# Patient Record
Sex: FEMALE | Race: BLACK OR AFRICAN AMERICAN
[De-identification: names, ages, dates, MRNs, and addresses within clinical notes are randomized per-mention and may not be internally consistent; named-entity substitution may affect disease eponyms.]

---

## 2017-08-11 ENCOUNTER — RECORD ABSTRACTING (OUTPATIENT)
Age: 62
End: 2017-08-11

## 2017-08-11 DIAGNOSIS — E04.0 NONTOXIC DIFFUSE GOITER: ICD-10-CM

## 2017-08-11 DIAGNOSIS — Z82.49 FAMILY HISTORY OF ISCHEMIC HEART DISEASE AND OTHER DISEASES OF THE CIRCULATORY SYSTEM: ICD-10-CM

## 2017-08-11 DIAGNOSIS — Z86.69 PERSONAL HISTORY OF OTHER DISEASES OF THE NERVOUS SYSTEM AND SENSE ORGANS: ICD-10-CM

## 2017-08-11 DIAGNOSIS — Z78.9 OTHER SPECIFIED HEALTH STATUS: ICD-10-CM

## 2017-08-21 ENCOUNTER — APPOINTMENT (OUTPATIENT)
Dept: CARDIOLOGY | Facility: CLINIC | Age: 62
End: 2017-08-21
Payer: MEDICARE

## 2017-08-21 VITALS — OXYGEN SATURATION: 98 % | WEIGHT: 191 LBS | HEIGHT: 67 IN | HEART RATE: 83 BPM | BODY MASS INDEX: 29.98 KG/M2

## 2017-08-21 VITALS — SYSTOLIC BLOOD PRESSURE: 110 MMHG | DIASTOLIC BLOOD PRESSURE: 80 MMHG

## 2017-08-21 PROCEDURE — 99214 OFFICE O/P EST MOD 30 MIN: CPT | Mod: 25

## 2017-08-21 PROCEDURE — 93000 ELECTROCARDIOGRAM COMPLETE: CPT

## 2017-08-28 ENCOUNTER — OTHER (OUTPATIENT)
Age: 62
End: 2017-08-28

## 2017-11-13 ENCOUNTER — APPOINTMENT (OUTPATIENT)
Dept: CARDIOLOGY | Facility: CLINIC | Age: 62
End: 2017-11-13
Payer: MEDICARE

## 2017-11-13 VITALS — SYSTOLIC BLOOD PRESSURE: 130 MMHG | DIASTOLIC BLOOD PRESSURE: 80 MMHG

## 2017-11-13 PROCEDURE — 93000 ELECTROCARDIOGRAM COMPLETE: CPT

## 2017-11-13 PROCEDURE — 99214 OFFICE O/P EST MOD 30 MIN: CPT

## 2018-01-16 ENCOUNTER — APPOINTMENT (OUTPATIENT)
Dept: OTOLARYNGOLOGY | Facility: CLINIC | Age: 63
End: 2018-01-16
Payer: MEDICARE

## 2018-01-16 VITALS
RESPIRATION RATE: 18 BRPM | WEIGHT: 194 LBS | BODY MASS INDEX: 30.09 KG/M2 | SYSTOLIC BLOOD PRESSURE: 130 MMHG | DIASTOLIC BLOOD PRESSURE: 80 MMHG | HEIGHT: 67.5 IN | HEART RATE: 83 BPM

## 2018-01-16 DIAGNOSIS — M54.2 CERVICALGIA: ICD-10-CM

## 2018-01-16 DIAGNOSIS — K11.8 OTHER DISEASES OF SALIVARY GLANDS: ICD-10-CM

## 2018-01-16 DIAGNOSIS — M47.812 SPONDYLOSIS W/OUT MYELOPATHY OR RADICULOPATHY, CERVICAL REGION: ICD-10-CM

## 2018-01-16 PROCEDURE — 99204 OFFICE O/P NEW MOD 45 MIN: CPT

## 2018-02-05 ENCOUNTER — APPOINTMENT (OUTPATIENT)
Dept: CARDIOLOGY | Facility: CLINIC | Age: 63
End: 2018-02-05
Payer: MEDICARE

## 2018-02-05 ENCOUNTER — NON-APPOINTMENT (OUTPATIENT)
Age: 63
End: 2018-02-05

## 2018-02-05 VITALS
RESPIRATION RATE: 17 BRPM | BODY MASS INDEX: 29.01 KG/M2 | OXYGEN SATURATION: 94 % | WEIGHT: 188 LBS | HEART RATE: 58 BPM

## 2018-02-05 VITALS — DIASTOLIC BLOOD PRESSURE: 80 MMHG | SYSTOLIC BLOOD PRESSURE: 112 MMHG

## 2018-02-05 PROCEDURE — 93000 ELECTROCARDIOGRAM COMPLETE: CPT

## 2018-02-05 PROCEDURE — 99213 OFFICE O/P EST LOW 20 MIN: CPT

## 2018-06-08 ENCOUNTER — APPOINTMENT (OUTPATIENT)
Dept: CARDIOLOGY | Facility: CLINIC | Age: 63
End: 2018-06-08
Payer: MEDICARE

## 2018-06-08 ENCOUNTER — NON-APPOINTMENT (OUTPATIENT)
Age: 63
End: 2018-06-08

## 2018-06-08 VITALS — DIASTOLIC BLOOD PRESSURE: 80 MMHG | SYSTOLIC BLOOD PRESSURE: 120 MMHG

## 2018-06-08 VITALS — HEIGHT: 67.5 IN | WEIGHT: 186 LBS | BODY MASS INDEX: 28.85 KG/M2 | OXYGEN SATURATION: 98 % | HEART RATE: 90 BPM

## 2018-06-08 PROCEDURE — 99213 OFFICE O/P EST LOW 20 MIN: CPT

## 2018-08-08 ENCOUNTER — APPOINTMENT (OUTPATIENT)
Dept: NEUROLOGY | Facility: CLINIC | Age: 63
End: 2018-08-08

## 2018-12-12 ENCOUNTER — APPOINTMENT (OUTPATIENT)
Dept: CARDIOLOGY | Facility: CLINIC | Age: 63
End: 2018-12-12
Payer: MEDICARE

## 2018-12-12 ENCOUNTER — NON-APPOINTMENT (OUTPATIENT)
Age: 63
End: 2018-12-12

## 2018-12-12 VITALS
WEIGHT: 183 LBS | HEIGHT: 67.5 IN | OXYGEN SATURATION: 96 % | HEART RATE: 94 BPM | BODY MASS INDEX: 28.39 KG/M2 | RESPIRATION RATE: 16 BRPM

## 2018-12-12 VITALS — DIASTOLIC BLOOD PRESSURE: 70 MMHG | SYSTOLIC BLOOD PRESSURE: 116 MMHG

## 2018-12-12 PROCEDURE — 99214 OFFICE O/P EST MOD 30 MIN: CPT

## 2018-12-12 PROCEDURE — 93000 ELECTROCARDIOGRAM COMPLETE: CPT

## 2018-12-12 RX ORDER — HYDROCORTISONE 1 %
12 CREAM (GRAM) TOPICAL
Qty: 225 | Refills: 0 | Status: DISCONTINUED | COMMUNITY
Start: 2018-11-29

## 2018-12-12 NOTE — DISCUSSION/SUMMARY
[FreeTextEntry1] : As she is doing well, did not make any changes in the medication.  I encouraged her to diet and increase exercise to help her lose weight and to improve cardiovascular fitness.  I will try and get copies of blood tests from Dr. Steve.

## 2018-12-12 NOTE — ASSESSMENT
[FreeTextEntry1] : She is stable from cardiac point of view.  Blood pressure is well controlled without medications.  And last A1c seems was 5.7 consistent with a prediabetes.

## 2018-12-12 NOTE — PHYSICAL EXAM
[General Appearance - Well Developed] : well developed [Normal Appearance] : normal appearance [Well Groomed] : well groomed [General Appearance - Well Nourished] : well nourished [No Deformities] : no deformities [General Appearance - In No Acute Distress] : no acute distress [Normal Conjunctiva] : the conjunctiva exhibited no abnormalities [Eyelids - No Xanthelasma] : the eyelids demonstrated no xanthelasmas [Normal Oral Mucosa] : normal oral mucosa [No Oral Pallor] : no oral pallor [No Oral Cyanosis] : no oral cyanosis [Normal Jugular Venous A Waves Present] : normal jugular venous A waves present [Normal Jugular Venous V Waves Present] : normal jugular venous V waves present [No Jugular Venous Henry A Waves] : no jugular venous henry A waves [Respiration, Rhythm And Depth] : normal respiratory rhythm and effort [Exaggerated Use Of Accessory Muscles For Inspiration] : no accessory muscle use [Auscultation Breath Sounds / Voice Sounds] : lungs were clear to auscultation bilaterally [Heart Rate And Rhythm] : heart rate and rhythm were normal [Heart Sounds] : normal S1 and S2 [Murmurs] : no murmurs present [Abdomen Soft] : soft [Abdomen Tenderness] : non-tender [Abdomen Mass (___ Cm)] : no abdominal mass palpated [Abnormal Walk] : normal gait [Gait - Sufficient For Exercise Testing] : the gait was sufficient for exercise testing [Nail Clubbing] : no clubbing of the fingernails [Cyanosis, Localized] : no localized cyanosis [Petechial Hemorrhages (___cm)] : no petechial hemorrhages [Skin Color & Pigmentation] : normal skin color and pigmentation [] : no rash [No Venous Stasis] : no venous stasis [Skin Lesions] : no skin lesions [No Skin Ulcers] : no skin ulcer [No Xanthoma] : no  xanthoma was observed [Oriented To Time, Place, And Person] : oriented to person, place, and time [Affect] : the affect was normal [Mood] : the mood was normal [No Anxiety] : not feeling anxious

## 2018-12-12 NOTE — HISTORY OF PRESENT ILLNESS
[FreeTextEntry1] : She denies any cardiac complaints.  She has gained 8 pounds in the last 2 months.

## 2019-05-13 ENCOUNTER — APPOINTMENT (OUTPATIENT)
Dept: CARDIOLOGY | Facility: CLINIC | Age: 64
End: 2019-05-13
Payer: MEDICARE

## 2019-05-13 ENCOUNTER — NON-APPOINTMENT (OUTPATIENT)
Age: 64
End: 2019-05-13

## 2019-05-13 VITALS — HEART RATE: 90 BPM | OXYGEN SATURATION: 98 % | HEIGHT: 67 IN | WEIGHT: 190 LBS | BODY MASS INDEX: 29.82 KG/M2

## 2019-05-13 PROCEDURE — 99214 OFFICE O/P EST MOD 30 MIN: CPT

## 2019-05-13 PROCEDURE — 93000 ELECTROCARDIOGRAM COMPLETE: CPT

## 2019-05-13 NOTE — HISTORY OF PRESENT ILLNESS
[FreeTextEntry1] : She denies any cardiac complaints.  She has gained about 8 pounds since her last visit.

## 2019-05-13 NOTE — DISCUSSION/SUMMARY
[FreeTextEntry1] : She is not on any cardiac medication at this time.  I did not make any changes.  Fasting blood tests were ordered.  I recommended that she continue her follow-up with the endocrinologist.

## 2019-05-13 NOTE — ASSESSMENT
[FreeTextEntry1] : She is doing well from cardiac point of view.  Blood pressure is well controlled.  The chest pain or palpitations etc.  Fasting blood tests are long overdue.

## 2019-05-13 NOTE — PHYSICAL EXAM
[General Appearance - Well Developed] : well developed [Normal Appearance] : normal appearance [Well Groomed] : well groomed [General Appearance - Well Nourished] : well nourished [No Deformities] : no deformities [General Appearance - In No Acute Distress] : no acute distress [Eyelids - No Xanthelasma] : the eyelids demonstrated no xanthelasmas [Normal Conjunctiva] : the conjunctiva exhibited no abnormalities [No Oral Pallor] : no oral pallor [Normal Oral Mucosa] : normal oral mucosa [No Oral Cyanosis] : no oral cyanosis [Normal Jugular Venous A Waves Present] : normal jugular venous A waves present [Normal Jugular Venous V Waves Present] : normal jugular venous V waves present [No Jugular Venous Henry A Waves] : no jugular venous henry A waves [Respiration, Rhythm And Depth] : normal respiratory rhythm and effort [Exaggerated Use Of Accessory Muscles For Inspiration] : no accessory muscle use [Heart Rate And Rhythm] : heart rate and rhythm were normal [Auscultation Breath Sounds / Voice Sounds] : lungs were clear to auscultation bilaterally [Heart Sounds] : normal S1 and S2 [Systolic grade ___/6] : A grade [unfilled]/6 systolic murmur was heard. [Abdomen Soft] : soft [Abdomen Tenderness] : non-tender [Abnormal Walk] : normal gait [Abdomen Mass (___ Cm)] : no abdominal mass palpated [Gait - Sufficient For Exercise Testing] : the gait was sufficient for exercise testing [Nail Clubbing] : no clubbing of the fingernails [Cyanosis, Localized] : no localized cyanosis [Petechial Hemorrhages (___cm)] : no petechial hemorrhages [Skin Color & Pigmentation] : normal skin color and pigmentation [] : no rash [No Venous Stasis] : no venous stasis [Skin Lesions] : no skin lesions [No Skin Ulcers] : no skin ulcer [No Xanthoma] : no  xanthoma was observed [Oriented To Time, Place, And Person] : oriented to person, place, and time [Affect] : the affect was normal [Mood] : the mood was normal [No Anxiety] : not feeling anxious

## 2019-11-11 ENCOUNTER — APPOINTMENT (OUTPATIENT)
Dept: CARDIOLOGY | Facility: CLINIC | Age: 64
End: 2019-11-11

## 2021-09-13 ENCOUNTER — APPOINTMENT (OUTPATIENT)
Dept: INTERNAL MEDICINE | Facility: CLINIC | Age: 66
End: 2021-09-13
Payer: MEDICARE

## 2021-09-13 VITALS
HEIGHT: 65 IN | WEIGHT: 189 LBS | TEMPERATURE: 97.6 F | BODY MASS INDEX: 31.49 KG/M2 | OXYGEN SATURATION: 98 % | SYSTOLIC BLOOD PRESSURE: 126 MMHG | HEART RATE: 99 BPM | DIASTOLIC BLOOD PRESSURE: 84 MMHG | RESPIRATION RATE: 17 BRPM

## 2021-09-13 VITALS — HEIGHT: 67.25 IN | BODY MASS INDEX: 29.38 KG/M2

## 2021-09-13 DIAGNOSIS — R09.89 OTHER SPECIFIED SYMPTOMS AND SIGNS INVOLVING THE CIRCULATORY AND RESPIRATORY SYSTEMS: ICD-10-CM

## 2021-09-13 DIAGNOSIS — Z86.79 PERSONAL HISTORY OF OTHER DISEASES OF THE CIRCULATORY SYSTEM: ICD-10-CM

## 2021-09-13 DIAGNOSIS — E78.5 HYPERLIPIDEMIA, UNSPECIFIED: ICD-10-CM

## 2021-09-13 DIAGNOSIS — Z23 ENCOUNTER FOR IMMUNIZATION: ICD-10-CM

## 2021-09-13 PROCEDURE — 99214 OFFICE O/P EST MOD 30 MIN: CPT

## 2021-09-18 PROBLEM — Z86.79 HISTORY OF HYPERTENSION: Status: RESOLVED | Noted: 2017-08-11 | Resolved: 2021-09-18

## 2021-09-18 PROBLEM — E78.5 DYSLIPIDEMIA: Status: RESOLVED | Noted: 2017-08-21 | Resolved: 2021-09-18

## 2021-09-18 NOTE — PHYSICAL EXAM
[No Acute Distress] : no acute distress [Well Nourished] : well nourished [Well Developed] : well developed [Well-Appearing] : well-appearing [Normal Sclera/Conjunctiva] : normal sclera/conjunctiva [PERRL] : pupils equal round and reactive to light [EOMI] : extraocular movements intact [Normal Outer Ear/Nose] : the outer ears and nose were normal in appearance [Normal Oropharynx] : the oropharynx was normal [No JVD] : no jugular venous distention [No Lymphadenopathy] : no lymphadenopathy [Supple] : supple [Thyroid Normal, No Nodules] : the thyroid was normal and there were no nodules present [No Respiratory Distress] : no respiratory distress  [No Accessory Muscle Use] : no accessory muscle use [Clear to Auscultation] : lungs were clear to auscultation bilaterally [Normal Rate] : normal rate  [Regular Rhythm] : with a regular rhythm [Normal S1, S2] : normal S1 and S2 [No Murmur] : no murmur heard [No Carotid Bruits] : no carotid bruits [No Abdominal Bruit] : a ~M bruit was not heard ~T in the abdomen [No Edema] : there was no peripheral edema [No Palpable Aorta] : no palpable aorta [No Extremity Clubbing/Cyanosis] : no extremity clubbing/cyanosis [Soft] : abdomen soft [Non Tender] : non-tender [Non-distended] : non-distended [No Masses] : no abdominal mass palpated [No HSM] : no HSM [Normal Bowel Sounds] : normal bowel sounds [Normal Posterior Cervical Nodes] : no posterior cervical lymphadenopathy [Normal Anterior Cervical Nodes] : no anterior cervical lymphadenopathy [No CVA Tenderness] : no CVA  tenderness [No Spinal Tenderness] : no spinal tenderness [No Joint Swelling] : no joint swelling [Grossly Normal Strength/Tone] : grossly normal strength/tone [No Rash] : no rash [Coordination Grossly Intact] : coordination grossly intact [No Focal Deficits] : no focal deficits [Normal Gait] : normal gait [Deep Tendon Reflexes (DTR)] : deep tendon reflexes were 2+ and symmetric [Normal Affect] : the affect was normal [Normal Insight/Judgement] : insight and judgment were intact [de-identified] : scatterred broken blood vessels, spider veins, areas of varicosities noted. palp dp, weak pt.

## 2021-09-18 NOTE — ASSESSMENT
[FreeTextEntry1] : Cold legs/ diminished PTibialis- chk ble art doppler. symptoms are likely radicular. \par Prediabetes- d/wl, chk labs\par Eagles syndrome- stable\par Glaucoma- stable, cont drops per optho\par Total time 30 min- 20 min. FTF and 10 min chart review and documentation.\par

## 2021-11-01 ENCOUNTER — APPOINTMENT (OUTPATIENT)
Dept: UROLOGY | Facility: CLINIC | Age: 66
End: 2021-11-01
Payer: MEDICARE

## 2021-11-01 ENCOUNTER — NON-APPOINTMENT (OUTPATIENT)
Age: 66
End: 2021-11-01

## 2021-11-01 VITALS
BODY MASS INDEX: 29.27 KG/M2 | SYSTOLIC BLOOD PRESSURE: 104 MMHG | HEART RATE: 98 BPM | DIASTOLIC BLOOD PRESSURE: 71 MMHG | HEIGHT: 67 IN | TEMPERATURE: 97.5 F | WEIGHT: 186.5 LBS

## 2021-11-01 PROCEDURE — 99204 OFFICE O/P NEW MOD 45 MIN: CPT

## 2021-11-01 NOTE — LETTER BODY
[Dear  ___] : Dear  [unfilled], [Consult Letter:] : I had the pleasure of evaluating your patient, [unfilled]. [Please see my note below.] : Please see my note below. [Consult Closing:] : Thank you very much for allowing me to participate in the care of this patient.  If you have any questions, please do not hesitate to contact me. [Sincerely,] : Sincerely, [FreeTextEntry3] : Boyd Buitrago MD\par

## 2021-11-01 NOTE — REVIEW OF SYSTEMS
[Wake up at night to urinate  How many times?  ___] : wakes up to urinate [unfilled] times during the night [Leakage of urine with straining, coughing, laughing] : leakage of urine with straining, coughing, laughing

## 2021-11-01 NOTE — HISTORY OF PRESENT ILLNESS
[FreeTextEntry1] : new onset of stress incontinence since June 2021. Used to have urgency incontinence earlier, now has stress incontinence as well. Panties are wet in the night, soiled not heavy wet. \par She had some tests done by GYN, bladder scan and ?urodynamics. Had surgery for uterine prolapse and ? oophorectomy in September 2021. \par \par O/E no cystocele, no rectocele. No erosion. Stress incontinence could not be elicited as bladder was empty. \par \par will get records from GYN and hospital. RTC one month\par

## 2021-11-01 NOTE — PHYSICAL EXAM
[General Appearance - Well Developed] : well developed [General Appearance - Well Nourished] : well nourished [Normal Appearance] : normal appearance [Well Groomed] : well groomed [General Appearance - In No Acute Distress] : no acute distress [Edema] : no peripheral edema [Respiration, Rhythm And Depth] : normal respiratory rhythm and effort [Exaggerated Use Of Accessory Muscles For Inspiration] : no accessory muscle use [Abdomen Soft] : soft [Abdomen Tenderness] : non-tender [Costovertebral Angle Tenderness] : no ~M costovertebral angle tenderness [Urethral Meatus] : normal urethra [Urinary Bladder Findings] : the bladder was normal on palpation [External Female Genitalia] : normal external genitalia [Vagina] : normal vaginal exam [Normal Station and Gait] : the gait and station were normal for the patient's age [] : no rash [No Focal Deficits] : no focal deficits [Oriented To Time, Place, And Person] : oriented to person, place, and time [Affect] : the affect was normal [Mood] : the mood was normal [Not Anxious] : not anxious [No Palpable Adenopathy] : no palpable adenopathy [FreeTextEntry1] : s/p Hysterectomy

## 2021-12-01 ENCOUNTER — NON-APPOINTMENT (OUTPATIENT)
Age: 66
End: 2021-12-01

## 2021-12-06 ENCOUNTER — APPOINTMENT (OUTPATIENT)
Dept: UROLOGY | Facility: CLINIC | Age: 66
End: 2021-12-06

## 2021-12-14 ENCOUNTER — APPOINTMENT (OUTPATIENT)
Dept: INTERNAL MEDICINE | Facility: CLINIC | Age: 66
End: 2021-12-14

## 2021-12-22 ENCOUNTER — APPOINTMENT (OUTPATIENT)
Dept: UROLOGY | Facility: CLINIC | Age: 66
End: 2021-12-22
Payer: MEDICARE

## 2021-12-22 VITALS — SYSTOLIC BLOOD PRESSURE: 111 MMHG | TEMPERATURE: 97.1 F | DIASTOLIC BLOOD PRESSURE: 77 MMHG | HEART RATE: 132 BPM

## 2021-12-22 PROCEDURE — 99213 OFFICE O/P EST LOW 20 MIN: CPT

## 2021-12-22 NOTE — HISTORY OF PRESENT ILLNESS
[FreeTextEntry1] : new onset of stress incontinence since June 2021. Used to have urgency incontinence earlier, now has stress incontinence as well. Panties are wet in the night, soiled not heavy wet. \par She had some tests done by GYN, bladder scan and ?urodynamics. Had surgery for uterine prolapse and ? oophorectomy in September 2021. \par \par O/E no cystocele, no rectocele. No erosion. Stress incontinence could not be elicited as bladder was empty. \par \par will get records from GYN and hospital. RTC one month\par \par 12/22/2021: Ms. AGUILA is a 66 year female who presents today for a follow for mixed incontinence. She said she had urgency, but now if she coughs and sneezes she wets herself after her surgery. Surgery was Sacrocolpopexy anterior an posterior in Sept 2020 at [facility] for large cystocele and rectocele.  Since then developed stress incontinence. She is advised for Kegel exercise and to have a controlled diet. She is advised to have timed voiding to avoid leaking,\par \par Will get UA and culture \par \par Follow up in 3 months to check UA and culture.\par \par

## 2021-12-22 NOTE — ADDENDUM
[FreeTextEntry1] : I, Geovanny Gonzalez, acted solely as a scribe for Dr. Boyd Buitrago on this date, 12/22/2021. \par \par All medical record entries made by Scribe were at my Dr. Boyd Buitrago direction and personally dictated by be on, 12/22/2021. I have reviewed the chart and agree that the record accurately reflects my personal performance of the history, physical exam, assessment and plan, I have also personally directed, reviewed and agreed with the chart.\par

## 2021-12-22 NOTE — PHYSICAL EXAM

## 2021-12-23 LAB
APPEARANCE: CLEAR
BACTERIA: NEGATIVE
BILIRUBIN URINE: NEGATIVE
BLOOD URINE: NEGATIVE
COLOR: YELLOW
GLUCOSE QUALITATIVE U: NEGATIVE
HYALINE CASTS: 0 /LPF
KETONES URINE: NEGATIVE
LEUKOCYTE ESTERASE URINE: NEGATIVE
MICROSCOPIC-UA: NORMAL
NITRITE URINE: NEGATIVE
PH URINE: 7
PROTEIN URINE: NEGATIVE
RED BLOOD CELLS URINE: 1 /HPF
SPECIFIC GRAVITY URINE: 1.01
SQUAMOUS EPITHELIAL CELLS: 2 /HPF
UROBILINOGEN URINE: NORMAL
WHITE BLOOD CELLS URINE: 0 /HPF

## 2021-12-27 LAB — BACTERIA UR CULT: ABNORMAL

## 2021-12-28 ENCOUNTER — APPOINTMENT (OUTPATIENT)
Dept: INTERNAL MEDICINE | Facility: CLINIC | Age: 66
End: 2021-12-28
Payer: MEDICARE

## 2021-12-28 VITALS
TEMPERATURE: 96.6 F | HEIGHT: 67 IN | HEART RATE: 87 BPM | OXYGEN SATURATION: 98 % | WEIGHT: 192 LBS | SYSTOLIC BLOOD PRESSURE: 124 MMHG | RESPIRATION RATE: 18 BRPM | DIASTOLIC BLOOD PRESSURE: 82 MMHG | BODY MASS INDEX: 30.13 KG/M2

## 2021-12-28 PROCEDURE — 99214 OFFICE O/P EST MOD 30 MIN: CPT

## 2021-12-29 LAB
25(OH)D3 SERPL-MCNC: 21.4 NG/ML
ALBUMIN SERPL ELPH-MCNC: 4.4 G/DL
ALP BLD-CCNC: 84 U/L
ALT SERPL-CCNC: 28 U/L
ANION GAP SERPL CALC-SCNC: 12 MMOL/L
APPEARANCE: CLEAR
AST SERPL-CCNC: 22 U/L
BACTERIA: NEGATIVE
BASOPHILS # BLD AUTO: 0.04 K/UL
BASOPHILS NFR BLD AUTO: 1 %
BILIRUB SERPL-MCNC: 0.4 MG/DL
BILIRUBIN URINE: NEGATIVE
BLOOD URINE: NEGATIVE
BUN SERPL-MCNC: 9 MG/DL
CALCIUM SERPL-MCNC: 9.5 MG/DL
CHLORIDE SERPL-SCNC: 103 MMOL/L
CHOLEST SERPL-MCNC: 201 MG/DL
CO2 SERPL-SCNC: 27 MMOL/L
COLOR: NORMAL
CREAT SERPL-MCNC: 0.74 MG/DL
EOSINOPHIL # BLD AUTO: 0.03 K/UL
EOSINOPHIL NFR BLD AUTO: 0.7 %
ESTIMATED AVERAGE GLUCOSE: 128 MG/DL
FOLATE SERPL-MCNC: 12.4 NG/ML
GLUCOSE QUALITATIVE U: NEGATIVE
GLUCOSE SERPL-MCNC: 93 MG/DL
HBA1C MFR BLD HPLC: 6.1 %
HCT VFR BLD CALC: 42.5 %
HDLC SERPL-MCNC: 56 MG/DL
HGB BLD-MCNC: 14 G/DL
HYALINE CASTS: 1 /LPF
IMM GRANULOCYTES NFR BLD AUTO: 0 %
KETONES URINE: NEGATIVE
LDLC SERPL CALC-MCNC: 117 MG/DL
LEUKOCYTE ESTERASE URINE: NEGATIVE
LYMPHOCYTES # BLD AUTO: 1.91 K/UL
LYMPHOCYTES NFR BLD AUTO: 46.9 %
MAN DIFF?: NORMAL
MCHC RBC-ENTMCNC: 30.9 PG
MCHC RBC-ENTMCNC: 32.9 GM/DL
MCV RBC AUTO: 93.8 FL
MICROSCOPIC-UA: NORMAL
MONOCYTES # BLD AUTO: 0.35 K/UL
MONOCYTES NFR BLD AUTO: 8.6 %
NEUTROPHILS # BLD AUTO: 1.74 K/UL
NEUTROPHILS NFR BLD AUTO: 42.8 %
NITRITE URINE: NEGATIVE
NONHDLC SERPL-MCNC: 145 MG/DL
PH URINE: 7
PLATELET # BLD AUTO: 259 K/UL
POTASSIUM SERPL-SCNC: 4.5 MMOL/L
PROT SERPL-MCNC: 7.2 G/DL
PROTEIN URINE: NEGATIVE
RBC # BLD: 4.53 M/UL
RBC # FLD: 14 %
RED BLOOD CELLS URINE: 1 /HPF
SODIUM SERPL-SCNC: 142 MMOL/L
SPECIFIC GRAVITY URINE: 1.01
SQUAMOUS EPITHELIAL CELLS: 7 /HPF
T3 SERPL-MCNC: 126 NG/DL
T4 FREE SERPL-MCNC: 1.2 NG/DL
TRIGL SERPL-MCNC: 139 MG/DL
TSH SERPL-ACNC: 0.63 UIU/ML
UROBILINOGEN URINE: NORMAL
VIT B12 SERPL-MCNC: 489 PG/ML
WBC # FLD AUTO: 4.07 K/UL
WHITE BLOOD CELLS URINE: 2 /HPF

## 2021-12-29 NOTE — HISTORY OF PRESENT ILLNESS
[FreeTextEntry1] : Follow up  [de-identified] : pfizer booster nov.\par decl flu vac. \par Otherwise feels good. Appet, sleep, bms, voiding, mood all ok. no pain.\par

## 2021-12-29 NOTE — PHYSICAL EXAM
[No Acute Distress] : no acute distress [Well Nourished] : well nourished [Well Developed] : well developed [Well-Appearing] : well-appearing [Normal Sclera/Conjunctiva] : normal sclera/conjunctiva [PERRL] : pupils equal round and reactive to light [EOMI] : extraocular movements intact [Normal Outer Ear/Nose] : the outer ears and nose were normal in appearance [Normal Oropharynx] : the oropharynx was normal [No JVD] : no jugular venous distention [No Lymphadenopathy] : no lymphadenopathy [Supple] : supple [Thyroid Normal, No Nodules] : the thyroid was normal and there were no nodules present [No Respiratory Distress] : no respiratory distress  [No Accessory Muscle Use] : no accessory muscle use [Clear to Auscultation] : lungs were clear to auscultation bilaterally [Normal Rate] : normal rate  [Regular Rhythm] : with a regular rhythm [Normal S1, S2] : normal S1 and S2 [No Murmur] : no murmur heard [No Carotid Bruits] : no carotid bruits [No Abdominal Bruit] : a ~M bruit was not heard ~T in the abdomen [No Edema] : there was no peripheral edema [No Palpable Aorta] : no palpable aorta [No Extremity Clubbing/Cyanosis] : no extremity clubbing/cyanosis [Soft] : abdomen soft [Non Tender] : non-tender [Non-distended] : non-distended [No Masses] : no abdominal mass palpated [No HSM] : no HSM [Normal Bowel Sounds] : normal bowel sounds [Normal Posterior Cervical Nodes] : no posterior cervical lymphadenopathy [Normal Anterior Cervical Nodes] : no anterior cervical lymphadenopathy [No CVA Tenderness] : no CVA  tenderness [No Spinal Tenderness] : no spinal tenderness [No Joint Swelling] : no joint swelling [Grossly Normal Strength/Tone] : grossly normal strength/tone [No Rash] : no rash [Coordination Grossly Intact] : coordination grossly intact [No Focal Deficits] : no focal deficits [Normal Gait] : normal gait [Deep Tendon Reflexes (DTR)] : deep tendon reflexes were 2+ and symmetric [Normal Affect] : the affect was normal [Normal Insight/Judgement] : insight and judgment were intact [de-identified] : scatterred broken blood vessels, spider veins, areas of varicosities noted. palp dp, weak pt.

## 2021-12-29 NOTE — ASSESSMENT
Called pt. For TCM. LM with family to have pt. Call the office. Pt. Was sleeping at the time of the call.  Admitted for Influenza/ pneumonia. Admitted 2/14, discharged 2/20. Started on Augmentin, Prednisone, and lisinopril.    [FreeTextEntry1] : Prediabetes- d/wl, chk labs\par Eagles syndrome- stable\par Glaucoma- stable, cont drops per optho\par Total time 30 min- 20 min. FTF and 10 min chart review and documentation.\par

## 2021-12-30 ENCOUNTER — NON-APPOINTMENT (OUTPATIENT)
Age: 66
End: 2021-12-30

## 2021-12-30 LAB — BACTERIA UR CULT: NORMAL

## 2022-03-03 ENCOUNTER — APPOINTMENT (OUTPATIENT)
Dept: INTERNAL MEDICINE | Facility: CLINIC | Age: 67
End: 2022-03-03
Payer: MEDICARE

## 2022-03-03 VITALS
DIASTOLIC BLOOD PRESSURE: 78 MMHG | OXYGEN SATURATION: 98 % | BODY MASS INDEX: 29.51 KG/M2 | SYSTOLIC BLOOD PRESSURE: 126 MMHG | RESPIRATION RATE: 18 BRPM | TEMPERATURE: 96 F | WEIGHT: 188 LBS | HEART RATE: 78 BPM | HEIGHT: 67 IN

## 2022-03-03 DIAGNOSIS — R06.00 DYSPNEA, UNSPECIFIED: ICD-10-CM

## 2022-03-03 DIAGNOSIS — R05.9 COUGH, UNSPECIFIED: ICD-10-CM

## 2022-03-03 DIAGNOSIS — Z87.09 PERSONAL HISTORY OF OTHER DISEASES OF THE RESPIRATORY SYSTEM: ICD-10-CM

## 2022-03-03 PROCEDURE — 99214 OFFICE O/P EST MOD 30 MIN: CPT

## 2022-03-03 NOTE — ASSESSMENT
[FreeTextEntry1] : Prediabetes- d/wl, chk labs\par Eagles syndrome- stable\par Glaucoma- stable, cont drops per optho\par Cough- just with swallowing liquids- start with ENT eval, FEEST? \par Peripheral Neuropathy- r/o lumbar radic, b12 was ok. chk labs-lyme, esr, EVANGELINA. declines gabapentin. agrees to ncs ble. \par Chronic Venous insuff- low Na diet, elevate, compression stockings\par GOLDEN- primarily when climbing steps. seeing cardio. \par Pt. was encouraged to do all relevant screening tests including but not limited to mammogram, gyn visit, colonoscopy, bone density, annual skin exam.\par Total time 30 min- 20 min. FTF and 10 min chart review and documentation.\par \par \par

## 2022-03-03 NOTE — PHYSICAL EXAM
[No Acute Distress] : no acute distress [Well Nourished] : well nourished [Well Developed] : well developed [Well-Appearing] : well-appearing [Normal Sclera/Conjunctiva] : normal sclera/conjunctiva [PERRL] : pupils equal round and reactive to light [EOMI] : extraocular movements intact [Normal Outer Ear/Nose] : the outer ears and nose were normal in appearance [Normal Oropharynx] : the oropharynx was normal [No JVD] : no jugular venous distention [No Lymphadenopathy] : no lymphadenopathy [Supple] : supple [Thyroid Normal, No Nodules] : the thyroid was normal and there were no nodules present [No Respiratory Distress] : no respiratory distress  [No Accessory Muscle Use] : no accessory muscle use [Clear to Auscultation] : lungs were clear to auscultation bilaterally [Normal Rate] : normal rate  [Regular Rhythm] : with a regular rhythm [Normal S1, S2] : normal S1 and S2 [No Murmur] : no murmur heard [No Carotid Bruits] : no carotid bruits [No Abdominal Bruit] : a ~M bruit was not heard ~T in the abdomen [Pedal Pulses Present] : the pedal pulses are present [No Palpable Aorta] : no palpable aorta [No Extremity Clubbing/Cyanosis] : no extremity clubbing/cyanosis [Soft] : abdomen soft [Non Tender] : non-tender [Non-distended] : non-distended [No Masses] : no abdominal mass palpated [No HSM] : no HSM [Normal Bowel Sounds] : normal bowel sounds [Normal Posterior Cervical Nodes] : no posterior cervical lymphadenopathy [Normal Anterior Cervical Nodes] : no anterior cervical lymphadenopathy [No CVA Tenderness] : no CVA  tenderness [No Spinal Tenderness] : no spinal tenderness [No Joint Swelling] : no joint swelling [Grossly Normal Strength/Tone] : grossly normal strength/tone [No Rash] : no rash [Coordination Grossly Intact] : coordination grossly intact [No Focal Deficits] : no focal deficits [Normal Gait] : normal gait [Deep Tendon Reflexes (DTR)] : deep tendon reflexes were 2+ and symmetric [Normal Affect] : the affect was normal [Normal Insight/Judgement] : insight and judgment were intact [de-identified] : trace bipedal edema, spider veins, varicosites ankle/foot

## 2022-03-03 NOTE — HISTORY OF PRESENT ILLNESS
[FreeTextEntry1] : feet pain [de-identified] : feet tingling, bottom feels like leather x 1 m. h/o MRI l-s spine with hnp/stenosis but report unavail. \par cough- only after drinking some liquids. \par GOLDEN-seeing cardio shortly

## 2022-03-23 ENCOUNTER — APPOINTMENT (OUTPATIENT)
Dept: UROLOGY | Facility: CLINIC | Age: 67
End: 2022-03-23
Payer: MEDICARE

## 2022-03-23 VITALS
DIASTOLIC BLOOD PRESSURE: 83 MMHG | TEMPERATURE: 97.1 F | WEIGHT: 185 LBS | SYSTOLIC BLOOD PRESSURE: 132 MMHG | BODY MASS INDEX: 29.03 KG/M2 | HEIGHT: 67 IN | OXYGEN SATURATION: 98 % | HEART RATE: 84 BPM

## 2022-03-23 PROCEDURE — 51798 US URINE CAPACITY MEASURE: CPT

## 2022-03-23 PROCEDURE — 99213 OFFICE O/P EST LOW 20 MIN: CPT

## 2022-03-23 RX ORDER — LATANOPROST/PF 0.005 %
0.01 DROPS OPHTHALMIC (EYE)
Refills: 0 | Status: ACTIVE | COMMUNITY

## 2022-03-23 NOTE — HISTORY OF PRESENT ILLNESS
[FreeTextEntry1] : new onset of stress incontinence since June 2021. Used to have urgency incontinence earlier, now has stress incontinence as well. Panties are wet in the night, soiled not heavy wet. \par She had some tests done by GYN, bladder scan and ?urodynamics. Had surgery for uterine prolapse and ? oophorectomy in September 2021. \par \par O/E no cystocele, no rectocele. No erosion. Stress incontinence could not be elicited as bladder was empty. \par \par will get records from GYN and hospital. RTC one month\par \par 12/22/2021: Ms. AGUILA is a 66 year female who presents today for a follow for mixed incontinence. She said she had urgency, but now if she coughs and sneezes she wets herself after her surgery. Surgery was Sacrocolpopexy anterior an posterior in Sept 2020 at [facility] for large cystocele and rectocele.  Since then developed stress incontinence. She is advised for Kegel exercise and to have a controlled diet. She is advised to have timed voiding to avoid leaking,\par \par Will get UA and culture \par \par Follow up in 3 months to check UA and culture.\par \par 3/23/2022:Patient has mixed urinary incontinence. Patient has been on timed voiding and Kegel exercise.Patient shows some improvement post residual-zero\par PVR is 0 cc - continue the same regime \par \par Follow up in 3 months\par \par

## 2022-03-23 NOTE — PHYSICAL EXAM

## 2022-03-24 LAB
APPEARANCE: CLEAR
BILIRUBIN URINE: NEGATIVE
BLOOD URINE: NEGATIVE
COLOR: YELLOW
GLUCOSE QUALITATIVE U: NEGATIVE
KETONES URINE: NEGATIVE
LEUKOCYTE ESTERASE URINE: NEGATIVE
NITRITE URINE: NEGATIVE
PH URINE: 6
PROTEIN URINE: NORMAL
SPECIFIC GRAVITY URINE: 1.02
UROBILINOGEN URINE: NORMAL

## 2022-03-28 ENCOUNTER — APPOINTMENT (OUTPATIENT)
Dept: PHYSICAL MEDICINE AND REHAB | Facility: CLINIC | Age: 67
End: 2022-03-28

## 2022-03-29 LAB — BACTERIA UR CULT: ABNORMAL

## 2022-04-02 DIAGNOSIS — Z12.39 ENCOUNTER FOR OTHER SCREENING FOR MALIGNANT NEOPLASM OF BREAST: ICD-10-CM

## 2022-04-02 DIAGNOSIS — Z92.89 PERSONAL HISTORY OF OTHER MEDICAL TREATMENT: ICD-10-CM

## 2022-04-05 ENCOUNTER — APPOINTMENT (OUTPATIENT)
Dept: INTERNAL MEDICINE | Facility: CLINIC | Age: 67
End: 2022-04-05
Payer: MEDICARE

## 2022-04-05 ENCOUNTER — NON-APPOINTMENT (OUTPATIENT)
Age: 67
End: 2022-04-05

## 2022-04-05 VITALS
BODY MASS INDEX: 29.51 KG/M2 | SYSTOLIC BLOOD PRESSURE: 130 MMHG | WEIGHT: 188 LBS | RESPIRATION RATE: 18 BRPM | TEMPERATURE: 96.8 F | DIASTOLIC BLOOD PRESSURE: 82 MMHG | HEIGHT: 67 IN | OXYGEN SATURATION: 97 % | HEART RATE: 82 BPM

## 2022-04-05 PROCEDURE — G0439: CPT

## 2022-04-05 PROCEDURE — 99214 OFFICE O/P EST MOD 30 MIN: CPT | Mod: 25

## 2022-04-05 PROCEDURE — 36415 COLL VENOUS BLD VENIPUNCTURE: CPT

## 2022-04-05 PROCEDURE — 93000 ELECTROCARDIOGRAM COMPLETE: CPT

## 2022-04-06 NOTE — HEALTH RISK ASSESSMENT
[Good] : ~his/her~  mood as  good [Never] : Never [No] : In the past 12 months have you used drugs other than those required for medical reasons? No [No falls in past year] : Patient reported no falls in the past year [0] : 2) Feeling down, depressed, or hopeless: Not at all (0) [PHQ-2 Negative - No further assessment needed] : PHQ-2 Negative - No further assessment needed [Patient reported mammogram was normal] : Patient reported mammogram was normal [Patient/Caregiver not ready to engage] : , patient/caregiver not ready to engage [Name: ___] : Health Care Proxy's Name: [unfilled]  [Relationship: ___] : Relationship: [unfilled] [Audit-CScore] : 0 [de-identified] : none [de-identified] : healthy [[id number]] : 0 [Change in mental status noted] : No change in mental status noted [Language] : denies difficulty with language [Behavior] : denies difficulty with behavior [Handling Complex Tasks] : denies difficulty handling complex tasks [Reasoning] : denies difficulty with reasoning [None] : None [Alone] : lives alone [Retired] : retired [Feels Safe at Home] : Feels safe at home [Fully functional (bathing, dressing, toileting, transferring, walking, feeding)] : Fully functional (bathing, dressing, toileting, transferring, walking, feeding) [Fully functional (using the telephone, shopping, preparing meals, housekeeping, doing laundry, using] : Fully functional and needs no help or supervision to perform IADLs (using the telephone, shopping, preparing meals, housekeeping, doing laundry, using transportation, managing medications and managing finances) [Reports changes in hearing] : Reports no changes in hearing [Reports changes in vision] : Reports no changes in vision [Reports changes in dental health] : Reports no changes in dental health [MammogramDate] : 11.2021 [AdvancecareDate] : 4/5/22 [FreeTextEntry4] : # [phone]

## 2022-04-06 NOTE — ASSESSMENT
[FreeTextEntry1] : [facility]- dr tony leos, ptx eval\par Prediabetes- d/wl, chk labs\par Eagles syndrome- stable\par Glaucoma- stable, cont drops per optho\par cough resolved \par Peripheral Neuropathy- r/o lumbar radic, b12 was ok. chk labs-lyme, esr, EVANGELINA. declines gabapentin.\par Chronic Venous insuff- low Na diet, elevate, compression stockings\par GOLDEN- primarily when climbing steps. seeing cardio. \par Pt. was encouraged to do all relevant screening tests including but not limited to mammogram, gyn visit, colonoscopy, bone density, annual skin exam.\par \par \par \par

## 2022-04-06 NOTE — HISTORY OF PRESENT ILLNESS
[FreeTextEntry1] : atul [de-identified] : right lbp- mld, intermit, no b/b incont. \par Otherwise feels good. Appet, sleep, bms, voiding, mood all ok. no pain.\par

## 2022-04-08 LAB
25(OH)D3 SERPL-MCNC: 18.3 NG/ML
ALBUMIN SERPL ELPH-MCNC: 4.6 G/DL
ALP BLD-CCNC: 86 U/L
ALT SERPL-CCNC: 22 U/L
ANION GAP SERPL CALC-SCNC: 12 MMOL/L
APPEARANCE: CLEAR
APPEARANCE: CLEAR
AST SERPL-CCNC: 20 U/L
BACTERIA: NEGATIVE
BASOPHILS # BLD AUTO: 0.03 K/UL
BASOPHILS NFR BLD AUTO: 0.7 %
BILIRUB SERPL-MCNC: 0.3 MG/DL
BILIRUBIN URINE: NEGATIVE
BILIRUBIN URINE: NEGATIVE
BLOOD URINE: NEGATIVE
BLOOD URINE: NEGATIVE
BUN SERPL-MCNC: 8 MG/DL
CALCIUM SERPL-MCNC: 9.7 MG/DL
CHLORIDE SERPL-SCNC: 105 MMOL/L
CHOLEST SERPL-MCNC: 225 MG/DL
CO2 SERPL-SCNC: 26 MMOL/L
COLOR: NORMAL
COLOR: NORMAL
CREAT SERPL-MCNC: 0.69 MG/DL
EGFR: 95 ML/MIN/1.73M2
EOSINOPHIL # BLD AUTO: 0.02 K/UL
EOSINOPHIL NFR BLD AUTO: 0.5 %
ESTIMATED AVERAGE GLUCOSE: 126 MG/DL
GLUCOSE QUALITATIVE U: NEGATIVE
GLUCOSE QUALITATIVE U: NEGATIVE
GLUCOSE SERPL-MCNC: 97 MG/DL
HBA1C MFR BLD HPLC: 6 %
HCT VFR BLD CALC: 43.6 %
HDLC SERPL-MCNC: 59 MG/DL
HGB BLD-MCNC: 14.1 G/DL
HYALINE CASTS: 0 /LPF
IMM GRANULOCYTES NFR BLD AUTO: 0.2 %
KETONES URINE: NEGATIVE
KETONES URINE: NEGATIVE
LDLC SERPL CALC-MCNC: 135 MG/DL
LEUKOCYTE ESTERASE URINE: NEGATIVE
LEUKOCYTE ESTERASE URINE: NEGATIVE
LYMPHOCYTES # BLD AUTO: 2.06 K/UL
LYMPHOCYTES NFR BLD AUTO: 49.6 %
MAN DIFF?: NORMAL
MCHC RBC-ENTMCNC: 30.9 PG
MCHC RBC-ENTMCNC: 32.3 GM/DL
MCV RBC AUTO: 95.4 FL
MICROSCOPIC-UA: NORMAL
MONOCYTES # BLD AUTO: 0.31 K/UL
MONOCYTES NFR BLD AUTO: 7.5 %
NEUTROPHILS # BLD AUTO: 1.72 K/UL
NEUTROPHILS NFR BLD AUTO: 41.5 %
NITRITE URINE: NEGATIVE
NITRITE URINE: NEGATIVE
NONHDLC SERPL-MCNC: 166 MG/DL
PH URINE: 6
PH URINE: 6
PLATELET # BLD AUTO: 258 K/UL
POTASSIUM SERPL-SCNC: 4.2 MMOL/L
PROT SERPL-MCNC: 7.1 G/DL
PROTEIN URINE: NEGATIVE
PROTEIN URINE: NEGATIVE
RBC # BLD: 4.57 M/UL
RBC # FLD: 14 %
RED BLOOD CELLS URINE: 0 /HPF
SODIUM SERPL-SCNC: 143 MMOL/L
SPECIFIC GRAVITY URINE: 1.01
SPECIFIC GRAVITY URINE: 1.01
SQUAMOUS EPITHELIAL CELLS: 4 /HPF
T3 SERPL-MCNC: 134 NG/DL
T4 FREE SERPL-MCNC: 1.2 NG/DL
TRIGL SERPL-MCNC: 154 MG/DL
TSH SERPL-ACNC: 0.76 UIU/ML
UROBILINOGEN URINE: NORMAL
UROBILINOGEN URINE: NORMAL
VIT B12 SERPL-MCNC: 449 PG/ML
WBC # FLD AUTO: 4.15 K/UL
WHITE BLOOD CELLS URINE: 0 /HPF

## 2022-04-10 LAB — BACTERIA UR CULT: ABNORMAL

## 2022-06-12 ENCOUNTER — APPOINTMENT (OUTPATIENT)
Dept: INTERNAL MEDICINE | Facility: CLINIC | Age: 67
End: 2022-06-12
Payer: MEDICARE

## 2022-06-12 VITALS
WEIGHT: 188 LBS | SYSTOLIC BLOOD PRESSURE: 124 MMHG | HEART RATE: 88 BPM | TEMPERATURE: 96.6 F | DIASTOLIC BLOOD PRESSURE: 68 MMHG | HEIGHT: 67 IN | BODY MASS INDEX: 29.51 KG/M2 | RESPIRATION RATE: 18 BRPM | OXYGEN SATURATION: 98 %

## 2022-06-12 DIAGNOSIS — J31.0 CHRONIC RHINITIS: ICD-10-CM

## 2022-06-12 PROCEDURE — 99214 OFFICE O/P EST MOD 30 MIN: CPT | Mod: 25

## 2022-06-12 PROCEDURE — 36415 COLL VENOUS BLD VENIPUNCTURE: CPT

## 2022-06-12 NOTE — HISTORY OF PRESENT ILLNESS
[FreeTextEntry1] : sore throat\par The patient is here for a follow up visit to review their medications and chronic medical conditions.\par  [de-identified] : 2 wks mild irritated throat. also has some imbalance at times. not on meds, vitals ok.

## 2022-06-12 NOTE — PHYSICAL EXAM
[No Acute Distress] : no acute distress [Well Nourished] : well nourished [Well Developed] : well developed [Well-Appearing] : well-appearing [Normal Sclera/Conjunctiva] : normal sclera/conjunctiva [PERRL] : pupils equal round and reactive to light [EOMI] : extraocular movements intact [Normal Outer Ear/Nose] : the outer ears and nose were normal in appearance [Normal Oropharynx] : the oropharynx was normal [No JVD] : no jugular venous distention [No Lymphadenopathy] : no lymphadenopathy [Supple] : supple [Thyroid Normal, No Nodules] : the thyroid was normal and there were no nodules present [No Respiratory Distress] : no respiratory distress  [No Accessory Muscle Use] : no accessory muscle use [Clear to Auscultation] : lungs were clear to auscultation bilaterally [Normal Rate] : normal rate  [Regular Rhythm] : with a regular rhythm [Normal S1, S2] : normal S1 and S2 [No Murmur] : no murmur heard [No Carotid Bruits] : no carotid bruits [No Abdominal Bruit] : a ~M bruit was not heard ~T in the abdomen [No Varicosities] : no varicosities [Pedal Pulses Present] : the pedal pulses are present [No Edema] : there was no peripheral edema [No Palpable Aorta] : no palpable aorta [No Extremity Clubbing/Cyanosis] : no extremity clubbing/cyanosis [Normal Appearance] : normal in appearance [No Nipple Discharge] : no nipple discharge [No Axillary Lymphadenopathy] : no axillary lymphadenopathy [Soft] : abdomen soft [Non Tender] : non-tender [Non-distended] : non-distended [No Masses] : no abdominal mass palpated [No HSM] : no HSM [Normal Bowel Sounds] : normal bowel sounds [Normal Posterior Cervical Nodes] : no posterior cervical lymphadenopathy [Normal Anterior Cervical Nodes] : no anterior cervical lymphadenopathy [No CVA Tenderness] : no CVA  tenderness [No Spinal Tenderness] : no spinal tenderness [No Joint Swelling] : no joint swelling [Grossly Normal Strength/Tone] : grossly normal strength/tone [No Rash] : no rash [Coordination Grossly Intact] : coordination grossly intact [No Focal Deficits] : no focal deficits [Normal Gait] : normal gait [Deep Tendon Reflexes (DTR)] : deep tendon reflexes were 2+ and symmetric [Normal Affect] : the affect was normal [Normal Insight/Judgement] : insight and judgment were intact [de-identified] : no edema/no exudate/no erythema. mild pndrip noted

## 2022-06-12 NOTE — ASSESSMENT
[FreeTextEntry1] : [facility]-, ptx eval\par Prediabetes- d/wl, chk labs\par Eagles syndrome- stable\par Glaucoma- stable, cont drops per optho\par Peripheral Neuropathy- r/o lumbar radic, b12 was ok. chk labs-lyme, esr, EVANGELINA. declines gabapentin.- recently has been stable. cont to monitor. \par Chronic Venous insuff- low Na diet, elevate, compression stockings\par Chronic Rhinitis with post nasal drip- declines nasal steroids. ENT eval. \par Pt. was encouraged to do all relevant screening tests including but not limited to mammogram, gyn visit, colonoscopy, bone density, annual skin exam.\par Total time 30 min ( 20 min. FTF and 10 min chart review and documentation.)\par \par \par \par \par

## 2022-06-13 ENCOUNTER — NON-APPOINTMENT (OUTPATIENT)
Age: 67
End: 2022-06-13

## 2022-06-13 LAB
ALBUMIN SERPL ELPH-MCNC: 4.6 G/DL
ALP BLD-CCNC: 86 U/L
ALT SERPL-CCNC: 21 U/L
ANION GAP SERPL CALC-SCNC: 13 MMOL/L
AST SERPL-CCNC: 18 U/L
BASOPHILS # BLD AUTO: 0.03 K/UL
BASOPHILS NFR BLD AUTO: 0.8 %
BILIRUB SERPL-MCNC: 0.4 MG/DL
BUN SERPL-MCNC: 11 MG/DL
CALCIUM SERPL-MCNC: 9.7 MG/DL
CHLORIDE SERPL-SCNC: 104 MMOL/L
CHOLEST SERPL-MCNC: 238 MG/DL
CO2 SERPL-SCNC: 24 MMOL/L
CREAT SERPL-MCNC: 0.7 MG/DL
EGFR: 95 ML/MIN/1.73M2
EOSINOPHIL # BLD AUTO: 0.02 K/UL
EOSINOPHIL NFR BLD AUTO: 0.5 %
ESTIMATED AVERAGE GLUCOSE: 126 MG/DL
GLUCOSE SERPL-MCNC: 104 MG/DL
HBA1C MFR BLD HPLC: 6 %
HCT VFR BLD CALC: 44.1 %
HDLC SERPL-MCNC: 60 MG/DL
HGB BLD-MCNC: 14.3 G/DL
IMM GRANULOCYTES NFR BLD AUTO: 0 %
LDLC SERPL CALC-MCNC: 155 MG/DL
LYMPHOCYTES # BLD AUTO: 1.69 K/UL
LYMPHOCYTES NFR BLD AUTO: 44.1 %
MAN DIFF?: NORMAL
MCHC RBC-ENTMCNC: 30 PG
MCHC RBC-ENTMCNC: 32.4 GM/DL
MCV RBC AUTO: 92.6 FL
MONOCYTES # BLD AUTO: 0.32 K/UL
MONOCYTES NFR BLD AUTO: 8.4 %
NEUTROPHILS # BLD AUTO: 1.77 K/UL
NEUTROPHILS NFR BLD AUTO: 46.2 %
NONHDLC SERPL-MCNC: 178 MG/DL
PLATELET # BLD AUTO: 277 K/UL
POTASSIUM SERPL-SCNC: 4.3 MMOL/L
PROT SERPL-MCNC: 7.6 G/DL
RBC # BLD: 4.76 M/UL
RBC # FLD: 14.2 %
SODIUM SERPL-SCNC: 142 MMOL/L
TRIGL SERPL-MCNC: 112 MG/DL
WBC # FLD AUTO: 3.83 K/UL

## 2022-06-14 ENCOUNTER — APPOINTMENT (OUTPATIENT)
Dept: ORTHOPEDIC SURGERY | Facility: CLINIC | Age: 67
End: 2022-06-14
Payer: MEDICARE

## 2022-06-14 VITALS — BODY MASS INDEX: 29.51 KG/M2 | WEIGHT: 188 LBS | HEIGHT: 67 IN

## 2022-06-14 PROCEDURE — 99214 OFFICE O/P EST MOD 30 MIN: CPT

## 2022-06-14 PROCEDURE — 73565 X-RAY EXAM OF KNEES: CPT

## 2022-06-14 PROCEDURE — 73560 X-RAY EXAM OF KNEE 1 OR 2: CPT | Mod: LT

## 2022-06-14 PROCEDURE — 72100 X-RAY EXAM L-S SPINE 2/3 VWS: CPT

## 2022-06-14 PROCEDURE — 72170 X-RAY EXAM OF PELVIS: CPT

## 2022-06-14 RX ORDER — ALBUTEROL SULFATE 90 UG/1
108 (90 BASE) AEROSOL, METERED RESPIRATORY (INHALATION)
Refills: 0 | Status: COMPLETED | COMMUNITY
Start: 2021-05-03 | End: 2022-06-14

## 2022-06-14 NOTE — IMAGING
[Left] : left knee [Mild patellofemoral OA] : Mild patellofemoral OA [Right] : right knee [AP Standing] : anteroposterior standing [Moderate tricompartmental OA medial narrowing] : Moderate tricompartmental OA medial narrowing [Disc space narrowing] : Disc space narrowing [AP] : anteroposterior [There are no fractures, subluxations or dislocations. No significant abnormalities are seen] : There are no fractures, subluxations or dislocations. No significant abnormalities are seen

## 2022-06-14 NOTE — HISTORY OF PRESENT ILLNESS
[9] : 9 [5] : 5 [Dull/Aching] : dull/aching [Throbbing] : throbbing [Constant] : constant [Household chores] : household chores [Leisure] : leisure [Sleep] : sleep [Nothing helps with pain getting better] : Nothing helps with pain getting better [Standing] : standing [Walking] : walking [de-identified] : Patient presents with exacerbation of left knee pain for the past 3 months. No new injury. Describes pain and stiffness the is limiting ADLs. Difficulty rising from a seated position. Intermittent buckling. She has been using ice with some relief. Intermittent low back pain.  [] : Post Surgical Visit: no [FreeTextEntry1] : dwayne knees, back [FreeTextEntry5] : pt has pain and stiffness in both knees with pain in the back

## 2022-06-14 NOTE — DISCUSSION/SUMMARY
[de-identified] : The patient was advised of the diagnosis. The natural history of the pathology was explained in full to the patient in layman's terms. All questions were answered. The risks and benefits of surgical and non-surgical treatment alternatives were explained in full to the patient.\par \par \par \par Progress note completed by JOSELYN Escobedo.\par \par Physician Instructions:\par The documentation recorded by the PA accurately reflects the service I personally performed and decisions made by me.\par  will use voltaren gel for knees. discussed . does not want cortisone especially due to her diabetes. she will think about visco. all explained/\par Therapy vs home exercises discussed. all explained

## 2022-06-14 NOTE — PHYSICAL EXAM
[Bending to left] : bending to left [Bending to right] : bending to right [Left] : left knee [NL (0)] : extension 0 degrees [5___] : hamstring 5[unfilled]/5 [de-identified] : extension 20 degrees [] : no erythema [TWNoteComboBox7] : flexion 130 degrees

## 2022-06-29 ENCOUNTER — APPOINTMENT (OUTPATIENT)
Dept: UROLOGY | Facility: CLINIC | Age: 67
End: 2022-06-29

## 2022-08-09 ENCOUNTER — APPOINTMENT (OUTPATIENT)
Dept: ORTHOPEDIC SURGERY | Facility: CLINIC | Age: 67
End: 2022-08-09

## 2022-10-03 ENCOUNTER — APPOINTMENT (OUTPATIENT)
Dept: INTERNAL MEDICINE | Facility: CLINIC | Age: 67
End: 2022-10-03

## 2022-10-03 VITALS
DIASTOLIC BLOOD PRESSURE: 82 MMHG | HEIGHT: 67 IN | TEMPERATURE: 96.9 F | WEIGHT: 184 LBS | OXYGEN SATURATION: 98 % | SYSTOLIC BLOOD PRESSURE: 118 MMHG | RESPIRATION RATE: 18 BRPM | BODY MASS INDEX: 28.88 KG/M2 | HEART RATE: 85 BPM

## 2022-10-03 DIAGNOSIS — K21.9 GASTRO-ESOPHAGEAL REFLUX DISEASE W/OUT ESOPHAGITIS: ICD-10-CM

## 2022-10-03 DIAGNOSIS — I87.2 VENOUS INSUFFICIENCY (CHRONIC) (PERIPHERAL): ICD-10-CM

## 2022-10-03 DIAGNOSIS — I83.93 ASYMPTOMATIC VARICOSE VEINS OF BILATERAL LOWER EXTREMITIES: ICD-10-CM

## 2022-10-03 DIAGNOSIS — G62.9 POLYNEUROPATHY, UNSPECIFIED: ICD-10-CM

## 2022-10-03 PROCEDURE — 99214 OFFICE O/P EST MOD 30 MIN: CPT

## 2022-10-03 NOTE — REVIEW OF SYSTEMS
Pt is on her way to [place] now to get swabs done, they will stop by here after. Pt advised of outdoor visit.   [Negative] : Heme/Lymph

## 2022-10-03 NOTE — HISTORY OF PRESENT ILLNESS
[FreeTextEntry1] : The patient is here for a follow up visit to review their medications and chronic medical conditions.\par prediabetes [de-identified] : pfizer bivalent booster\par declines pneumovax/ shingrex.\par Otherwise feels good. Appet, sleep, bms, voiding, mood all ok. \par ble varicosities\par mild peripheral neuropathy sympt\par

## 2022-10-03 NOTE — PHYSICAL EXAM
[No Acute Distress] : no acute distress [Well Nourished] : well nourished [Well Developed] : well developed [Well-Appearing] : well-appearing [Normal Sclera/Conjunctiva] : normal sclera/conjunctiva [PERRL] : pupils equal round and reactive to light [EOMI] : extraocular movements intact [Normal Outer Ear/Nose] : the outer ears and nose were normal in appearance [Normal Oropharynx] : the oropharynx was normal [No JVD] : no jugular venous distention [No Lymphadenopathy] : no lymphadenopathy [Supple] : supple [Thyroid Normal, No Nodules] : the thyroid was normal and there were no nodules present [No Respiratory Distress] : no respiratory distress  [No Accessory Muscle Use] : no accessory muscle use [Clear to Auscultation] : lungs were clear to auscultation bilaterally [Normal Rate] : normal rate  [Regular Rhythm] : with a regular rhythm [Normal S1, S2] : normal S1 and S2 [No Murmur] : no murmur heard [No Carotid Bruits] : no carotid bruits [No Abdominal Bruit] : a ~M bruit was not heard ~T in the abdomen [Pedal Pulses Present] : the pedal pulses are present [No Edema] : there was no peripheral edema [No Palpable Aorta] : no palpable aorta [No Extremity Clubbing/Cyanosis] : no extremity clubbing/cyanosis [Normal Appearance] : normal in appearance [No Nipple Discharge] : no nipple discharge [No Axillary Lymphadenopathy] : no axillary lymphadenopathy [Soft] : abdomen soft [Non Tender] : non-tender [Non-distended] : non-distended [No Masses] : no abdominal mass palpated [No HSM] : no HSM [Normal Bowel Sounds] : normal bowel sounds [Normal Posterior Cervical Nodes] : no posterior cervical lymphadenopathy [Normal Anterior Cervical Nodes] : no anterior cervical lymphadenopathy [No CVA Tenderness] : no CVA  tenderness [No Spinal Tenderness] : no spinal tenderness [No Joint Swelling] : no joint swelling [Grossly Normal Strength/Tone] : grossly normal strength/tone [No Rash] : no rash [Coordination Grossly Intact] : coordination grossly intact [No Focal Deficits] : no focal deficits [Normal Gait] : normal gait [Deep Tendon Reflexes (DTR)] : deep tendon reflexes were 2+ and symmetric [Normal Affect] : the affect was normal [Normal Insight/Judgement] : insight and judgment were intact [de-identified] : spider veins few small varicosities noted ble

## 2022-10-03 NOTE — ASSESSMENT
[FreeTextEntry1] : Prediabetes- d/wl, chk labs\par Eagles syndrome- stable\par Glaucoma- stable, cont drops per optho\par Peripheral Neuropathy- discussed w/u. doesn’t bother her enough to pursue at this time\par Chronic Venous insuff- low Na diet, elevate, compression stockings\par Spider veins/ varicosities- referred to dr pierce\par DJD knees- sympt tx\par Thyroid Nodule history- rpt thyroid sono\par Gerd- Discussed lifestyle modifications including smaller frequent meals, avoiding trigger foods, increasing the incline of their mattress, don’t lie down for 30min after a meal.\par \par Pt. was encouraged to do all relevant screening tests including but not limited to mammogram, gyn visit, colonoscopy, bone density, annual skin exam.\par Total time 30 min ( 20 min. FTF and 10 min chart review and documentation.)\par \par \par \par \par

## 2022-10-04 ENCOUNTER — NON-APPOINTMENT (OUTPATIENT)
Age: 67
End: 2022-10-04

## 2022-10-04 LAB
25(OH)D3 SERPL-MCNC: 26.7 NG/ML
ALBUMIN SERPL ELPH-MCNC: 4.7 G/DL
ALP BLD-CCNC: 83 U/L
ALT SERPL-CCNC: 25 U/L
ANION GAP SERPL CALC-SCNC: 12 MMOL/L
APPEARANCE: CLEAR
AST SERPL-CCNC: 22 U/L
BACTERIA: NEGATIVE
BASOPHILS # BLD AUTO: 0.04 K/UL
BASOPHILS NFR BLD AUTO: 1.1 %
BILIRUB SERPL-MCNC: 0.4 MG/DL
BILIRUBIN URINE: NEGATIVE
BLOOD URINE: NEGATIVE
BUN SERPL-MCNC: 7 MG/DL
CALCIUM SERPL-MCNC: 9.9 MG/DL
CHLORIDE SERPL-SCNC: 102 MMOL/L
CHOLEST SERPL-MCNC: 253 MG/DL
CO2 SERPL-SCNC: 26 MMOL/L
COLOR: NORMAL
CREAT SERPL-MCNC: 0.71 MG/DL
EGFR: 93 ML/MIN/1.73M2
EOSINOPHIL # BLD AUTO: 0.03 K/UL
EOSINOPHIL NFR BLD AUTO: 0.8 %
ESTIMATED AVERAGE GLUCOSE: 126 MG/DL
GLUCOSE QUALITATIVE U: NEGATIVE
GLUCOSE SERPL-MCNC: 95 MG/DL
HBA1C MFR BLD HPLC: 6 %
HCT VFR BLD CALC: 44.5 %
HDLC SERPL-MCNC: 58 MG/DL
HGB BLD-MCNC: 14.3 G/DL
HYALINE CASTS: 0 /LPF
IMM GRANULOCYTES NFR BLD AUTO: 0.3 %
IRON SATN MFR SERPL: 29 %
IRON SERPL-MCNC: 98 UG/DL
KETONES URINE: NEGATIVE
LDLC SERPL CALC-MCNC: 159 MG/DL
LEUKOCYTE ESTERASE URINE: NEGATIVE
LYMPHOCYTES # BLD AUTO: 1.66 K/UL
LYMPHOCYTES NFR BLD AUTO: 44.7 %
MAN DIFF?: NORMAL
MCHC RBC-ENTMCNC: 30.4 PG
MCHC RBC-ENTMCNC: 32.1 GM/DL
MCV RBC AUTO: 94.5 FL
MICROSCOPIC-UA: NORMAL
MONOCYTES # BLD AUTO: 0.3 K/UL
MONOCYTES NFR BLD AUTO: 8.1 %
NEUTROPHILS # BLD AUTO: 1.67 K/UL
NEUTROPHILS NFR BLD AUTO: 45 %
NITRITE URINE: NEGATIVE
NONHDLC SERPL-MCNC: 195 MG/DL
PH URINE: 7.5
PLATELET # BLD AUTO: 239 K/UL
POTASSIUM SERPL-SCNC: 4.2 MMOL/L
PROT SERPL-MCNC: 7.4 G/DL
PROTEIN URINE: NEGATIVE
RBC # BLD: 4.71 M/UL
RBC # FLD: 14.4 %
RED BLOOD CELLS URINE: 0 /HPF
SODIUM SERPL-SCNC: 140 MMOL/L
SPECIFIC GRAVITY URINE: 1.01
SQUAMOUS EPITHELIAL CELLS: 1 /HPF
T3 SERPL-MCNC: 145 NG/DL
T4 FREE SERPL-MCNC: 1.2 NG/DL
TIBC SERPL-MCNC: 336 UG/DL
TRIGL SERPL-MCNC: 181 MG/DL
TSH SERPL-ACNC: 1.1 UIU/ML
UIBC SERPL-MCNC: 238 UG/DL
UROBILINOGEN URINE: NORMAL
WBC # FLD AUTO: 3.71 K/UL
WHITE BLOOD CELLS URINE: 0 /HPF

## 2022-10-05 LAB — BACTERIA UR CULT: NORMAL

## 2022-10-31 ENCOUNTER — NON-APPOINTMENT (OUTPATIENT)
Age: 67
End: 2022-10-31

## 2022-12-01 ENCOUNTER — APPOINTMENT (OUTPATIENT)
Dept: ORTHOPEDIC SURGERY | Facility: CLINIC | Age: 67
End: 2022-12-01

## 2022-12-01 VITALS — HEIGHT: 67 IN | BODY MASS INDEX: 28.88 KG/M2 | WEIGHT: 184 LBS

## 2022-12-01 PROCEDURE — 73560 X-RAY EXAM OF KNEE 1 OR 2: CPT | Mod: 50

## 2022-12-01 PROCEDURE — 99214 OFFICE O/P EST MOD 30 MIN: CPT

## 2022-12-01 PROCEDURE — 73565 X-RAY EXAM OF KNEES: CPT

## 2022-12-01 RX ORDER — SODIUM SULFATE, MAGNESIUM SULFATE, AND POTASSIUM CHLORIDE 17.75; 2.7; 2.25 G/1; G/1; G/1
1479-225-188 TABLET ORAL
Qty: 24 | Refills: 0 | Status: COMPLETED | COMMUNITY
Start: 2022-07-18

## 2022-12-01 RX ORDER — COVID-19 MOLECULAR TEST ASSAY
KIT MISCELLANEOUS
Qty: 1 | Refills: 0 | Status: COMPLETED | COMMUNITY
Start: 2022-07-31

## 2022-12-01 NOTE — DISCUSSION/SUMMARY
[de-identified] : The patient was advised of the diagnosis. The natural history of the pathology was explained in full to the patient in layman's terms. All questions were answered. The risks and benefits of surgical and non-surgical treatment alternatives were explained in full to the patient.\par \par does not want knee injections. cortisone and orthovisc discussed. Has volataren gel at home to try. Total knees in future explained. MRI back and may need to consider pain management. \par Weight loss advised\par

## 2022-12-01 NOTE — HISTORY OF PRESENT ILLNESS
[de-identified] : pain knees persists. L> right . also sore back .Knees increased pain down stairs. No cathching or giving way. \par Back sore and no radiation to legs.

## 2022-12-01 NOTE — RETURN TO WORK/SCHOOL
[FreeTextEntry1] : patient from knee oa is limited stairs and stairs increased pain and should be limited. oa made worse from stairs

## 2022-12-01 NOTE — PHYSICAL EXAM
[Flexion] : flexion [Extension] : extension [Bilateral] : knee bilaterally [NL (0)] : extension 0 degrees [5___] : hamstring 5[unfilled]/5 [de-identified] : extension 10 degrees [de-identified] : left lateral bending 25 degrees [de-identified] : right lateral bending 25 degrees [] : non-antalgic [FreeTextEntry8] : more medial joint line pain Left compared to r  [TWNoteComboBox7] : flexion 120 degrees

## 2022-12-01 NOTE — IMAGING
[Bilateral] : knee bilaterally [Lateral] : lateral [[place]] : [place] [Moderate patellofemoral OA] : Moderate patellofemoral OA [AP Standing] : anteroposterior standing [Moderate tricompartmental OA medial narrowing] : Moderate tricompartmental OA medial narrowing

## 2022-12-12 ENCOUNTER — RESULT REVIEW (OUTPATIENT)
Age: 67
End: 2022-12-12

## 2022-12-22 ENCOUNTER — APPOINTMENT (OUTPATIENT)
Dept: ORTHOPEDIC SURGERY | Facility: CLINIC | Age: 67
End: 2022-12-22

## 2022-12-22 VITALS — WEIGHT: 188 LBS | BODY MASS INDEX: 29.16 KG/M2 | HEIGHT: 67.5 IN

## 2022-12-22 DIAGNOSIS — S83.262S PERIPHERAL TEAR OF LATERAL MENISCUS, CURRENT INJURY, LEFT KNEE, SEQUELA: ICD-10-CM

## 2022-12-22 DIAGNOSIS — S83.241D OTHER TEAR OF MEDIAL MENISCUS, CURRENT INJURY, RIGHT KNEE, SUBSEQUENT ENCOUNTER: ICD-10-CM

## 2022-12-22 PROCEDURE — 99214 OFFICE O/P EST MOD 30 MIN: CPT

## 2022-12-22 NOTE — DATA REVIEWED
[MRI] : MRI [Left] : left [Knee] : knee [Report was reviewed and noted in the chart] : The report was reviewed and noted in the chart [I reviewed the films/CD and agree] : I reviewed the films/CD and agree [FreeTextEntry1] : MMT and degen changes [FreeTextEntry3] : small LMT and degen changes

## 2022-12-22 NOTE — DISCUSSION/SUMMARY
[de-identified] : Patient allowed to gently start resuming activities.\par Discussed change to medication prescription and usage. \par Offered cortisone steroid injection. \par Bracing options discussed with patient. \par Hyaluronic Acid inj pamphlet given to pt.\par 12/22/2022 \par \par  RE:  LEIA AGUILA \par \par Acct #- [id number] \par \par \par Attention:  Nurse Reviewer /Medical Director\par \par I am writing this letter as a medical necessity for PT program.\par Patient has tried analgesics, non-steroid anti-inflammatory agents, \par hot or cold compresses,injections of corticosteroids, etc)  which in combination or by themselves has not worked.\par Based on my patient's condition, I strongly believe that the PT is medically needed.\par  \par Thank you for your time and consideration.   \par \par

## 2022-12-22 NOTE — HISTORY OF PRESENT ILLNESS
[8] : 8 [Throbbing] : throbbing [de-identified] : has had chronic issues no injury and had MRIs, recently over several months, no swelling [] : no [FreeTextEntry1] : bilateral knees [FreeTextEntry5] : pt has had chronic bilateral knee pain, which is on and off. pt states her pain returned again recently, left knee is bothering her more than the right knee [FreeTextEntry7] : down to her feet sometimes  [de-identified] : movement  [de-identified] : 12/01/2022 [de-identified] :   [de-identified] : 2 years ago  [de-identified] : XR and MRI

## 2022-12-22 NOTE — PHYSICAL EXAM
[Bilateral] : knee bilaterally [Equivocal] : equivocal Vero [] : mildly antalgic [TWNoteComboBox7] : flexion 130 degrees

## 2023-01-19 ENCOUNTER — APPOINTMENT (OUTPATIENT)
Dept: ORTHOPEDIC SURGERY | Facility: CLINIC | Age: 68
End: 2023-01-19

## 2023-02-06 ENCOUNTER — APPOINTMENT (OUTPATIENT)
Dept: ORTHOPEDIC SURGERY | Facility: CLINIC | Age: 68
End: 2023-02-06
Payer: MEDICARE

## 2023-02-06 VITALS — BODY MASS INDEX: 29.16 KG/M2 | WEIGHT: 188 LBS | HEIGHT: 67.5 IN

## 2023-02-06 DIAGNOSIS — M51.9 UNSPECIFIED THORACIC, THORACOLUMBAR AND LUMBOSACRAL INTERVERTEBRAL DISC DISORDER: ICD-10-CM

## 2023-02-06 PROCEDURE — 99214 OFFICE O/P EST MOD 30 MIN: CPT

## 2023-02-06 NOTE — DATA REVIEWED
[Lumbar Spine] : lumbar spine [MRI] : MRI [Bilateral] : of the bilateral [Knee] : knee [I reviewed the films/CD and agree] : I reviewed the films/CD and agree [FreeTextEntry1] : moderate stenosis at L5/S1, milder above [FreeTextEntry2] : advanced medial OA, extruded medial meniscus with meniscal tear

## 2023-02-06 NOTE — HISTORY OF PRESENT ILLNESS
[6] : 6 [de-identified] : 68 yo F here for eval of BL knees and low back. has been a long time pt of Dr Mix. Known OA in BL knees and known Lumbar DDD and stenosis. No previous CSI or gel inj for knees. Does PT with improvement.  [] : no [FreeTextEntry1] : L spine, dwayne knees [FreeTextEntry5] : No injury. L> R [de-identified] : Dec 2022 [de-identified] : Dr. Mix [de-identified] : MRI- R Knee, L Knee, L spine [de-identified] : none Never smoker

## 2023-02-06 NOTE — PHYSICAL EXAM
[Bilateral] : knee bilaterally [NL (0)] : extension 0 degrees [5___] : hamstring 5[unfilled]/5 [] : negative straight leg raise [TWNoteComboBox7] : flexion 125 degrees

## 2023-02-07 ENCOUNTER — APPOINTMENT (OUTPATIENT)
Dept: INTERNAL MEDICINE | Facility: CLINIC | Age: 68
End: 2023-02-07
Payer: MEDICARE

## 2023-02-07 VITALS
HEIGHT: 67 IN | BODY MASS INDEX: 29.66 KG/M2 | OXYGEN SATURATION: 98 % | HEART RATE: 89 BPM | RESPIRATION RATE: 18 BRPM | SYSTOLIC BLOOD PRESSURE: 118 MMHG | WEIGHT: 189 LBS | TEMPERATURE: 96 F | DIASTOLIC BLOOD PRESSURE: 80 MMHG

## 2023-02-07 DIAGNOSIS — M54.50 LOW BACK PAIN, UNSPECIFIED: ICD-10-CM

## 2023-02-07 PROCEDURE — 36415 COLL VENOUS BLD VENIPUNCTURE: CPT

## 2023-02-07 PROCEDURE — 99214 OFFICE O/P EST MOD 30 MIN: CPT | Mod: 25

## 2023-02-07 NOTE — ASSESSMENT
[FreeTextEntry1] : Prediabetes- d/wl, chk labs\par Eagles syndrome- stable\par Glaucoma- stable, cont drops per optho\par Peripheral Neuropathy- discussed w/u. doesn’t bother her enough to pursue at this time\par Chronic Venous insuff- low Na diet, elevate, compression stockings\par Spider veins/ varicosities- referred to dr pierce\par DJD knees- sympt tx, ptx, saw ortho\par Thyroid Nodule historyrefer to dr dean\par Gerd- Discussed lifestyle modifications including smaller frequent meals, avoiding trigger foods, increasing the incline of their mattress, don’t lie down for 30min after a meal.\par \par Pt. was encouraged to do all relevant screening tests including but not limited to mammogram, gyn visit, colonoscopy, bone density, annual skin exam.\par Total time 30 min ( 20 min. FTF and 10 min chart review and documentation.)\par \par \par \par \par

## 2023-02-07 NOTE — PHYSICAL EXAM
[No Acute Distress] : no acute distress [Well Nourished] : well nourished [Well Developed] : well developed [Well-Appearing] : well-appearing [Normal Sclera/Conjunctiva] : normal sclera/conjunctiva [PERRL] : pupils equal round and reactive to light [EOMI] : extraocular movements intact [Normal Outer Ear/Nose] : the outer ears and nose were normal in appearance [Normal Oropharynx] : the oropharynx was normal [No JVD] : no jugular venous distention [No Lymphadenopathy] : no lymphadenopathy [Supple] : supple [Thyroid Normal, No Nodules] : the thyroid was normal and there were no nodules present [No Respiratory Distress] : no respiratory distress  [No Accessory Muscle Use] : no accessory muscle use [Clear to Auscultation] : lungs were clear to auscultation bilaterally [Normal Rate] : normal rate  [Regular Rhythm] : with a regular rhythm [Normal S1, S2] : normal S1 and S2 [No Murmur] : no murmur heard [No Carotid Bruits] : no carotid bruits [No Abdominal Bruit] : a ~M bruit was not heard ~T in the abdomen [Pedal Pulses Present] : the pedal pulses are present [No Edema] : there was no peripheral edema [No Palpable Aorta] : no palpable aorta [No Extremity Clubbing/Cyanosis] : no extremity clubbing/cyanosis [Normal Appearance] : normal in appearance [No Nipple Discharge] : no nipple discharge [No Axillary Lymphadenopathy] : no axillary lymphadenopathy [Soft] : abdomen soft [Non Tender] : non-tender [Non-distended] : non-distended [No Masses] : no abdominal mass palpated [No HSM] : no HSM [Normal Bowel Sounds] : normal bowel sounds [No CVA Tenderness] : no CVA  tenderness [No Spinal Tenderness] : no spinal tenderness [No Joint Swelling] : no joint swelling [Grossly Normal Strength/Tone] : grossly normal strength/tone [No Rash] : no rash [Coordination Grossly Intact] : coordination grossly intact [No Focal Deficits] : no focal deficits [Normal Gait] : normal gait [Normal Affect] : the affect was normal [Deep Tendon Reflexes (DTR)] : deep tendon reflexes were 2+ and symmetric [Normal Insight/Judgement] : insight and judgment were intact [de-identified] : spider veins few small varicosities noted ble

## 2023-02-07 NOTE — HISTORY OF PRESENT ILLNESS
[FreeTextEntry1] : The patient is here for a follow up visit to review their medications and chronic medical conditions.\par knee pain,prediab, thyroid nod [de-identified] : Otherwise feels good. Appet, sleep, bms, voiding, mood all ok. no pain.\par Reviewed all interim as well as relevant prior consultations, labs and radiological studies.\par

## 2023-02-07 NOTE — HEALTH RISK ASSESSMENT
[Patient reported mammogram was normal] : Patient reported mammogram was normal [MammogramDate] : 12/2022

## 2023-02-08 LAB
25(OH)D3 SERPL-MCNC: 26.7 NG/ML
ALBUMIN SERPL ELPH-MCNC: 4.5 G/DL
ALP BLD-CCNC: 79 U/L
ALT SERPL-CCNC: 37 U/L
ANION GAP SERPL CALC-SCNC: 12 MMOL/L
AST SERPL-CCNC: 28 U/L
BASOPHILS # BLD AUTO: 0.04 K/UL
BASOPHILS NFR BLD AUTO: 1.2 %
BILIRUB SERPL-MCNC: 0.2 MG/DL
BUN SERPL-MCNC: 10 MG/DL
CALCIUM SERPL-MCNC: 9.5 MG/DL
CHLORIDE SERPL-SCNC: 105 MMOL/L
CHOLEST SERPL-MCNC: 208 MG/DL
CO2 SERPL-SCNC: 26 MMOL/L
CREAT SERPL-MCNC: 0.64 MG/DL
EGFR: 96 ML/MIN/1.73M2
EOSINOPHIL # BLD AUTO: 0.02 K/UL
EOSINOPHIL NFR BLD AUTO: 0.6 %
ESTIMATED AVERAGE GLUCOSE: 126 MG/DL
FOLATE SERPL-MCNC: >20 NG/ML
GLUCOSE SERPL-MCNC: 100 MG/DL
HBA1C MFR BLD HPLC: 6 %
HCT VFR BLD CALC: 43 %
HDLC SERPL-MCNC: 64 MG/DL
HGB BLD-MCNC: 13.6 G/DL
IMM GRANULOCYTES NFR BLD AUTO: 0.6 %
LDLC SERPL CALC-MCNC: 126 MG/DL
LYMPHOCYTES # BLD AUTO: 1.46 K/UL
LYMPHOCYTES NFR BLD AUTO: 42.7 %
MAN DIFF?: NORMAL
MCHC RBC-ENTMCNC: 30.3 PG
MCHC RBC-ENTMCNC: 31.6 GM/DL
MCV RBC AUTO: 95.8 FL
MONOCYTES # BLD AUTO: 0.3 K/UL
MONOCYTES NFR BLD AUTO: 8.8 %
NEUTROPHILS # BLD AUTO: 1.58 K/UL
NEUTROPHILS NFR BLD AUTO: 46.1 %
NONHDLC SERPL-MCNC: 144 MG/DL
PLATELET # BLD AUTO: 238 K/UL
POTASSIUM SERPL-SCNC: 4.3 MMOL/L
PROT SERPL-MCNC: 6.9 G/DL
RBC # BLD: 4.49 M/UL
RBC # FLD: 14.4 %
SODIUM SERPL-SCNC: 143 MMOL/L
T3 SERPL-MCNC: 114 NG/DL
T4 FREE SERPL-MCNC: 1.2 NG/DL
TRIGL SERPL-MCNC: 87 MG/DL
TSH SERPL-ACNC: 0.7 UIU/ML
VIT B12 SERPL-MCNC: 482 PG/ML
WBC # FLD AUTO: 3.42 K/UL

## 2023-02-09 ENCOUNTER — NON-APPOINTMENT (OUTPATIENT)
Age: 68
End: 2023-02-09

## 2023-04-10 ENCOUNTER — APPOINTMENT (OUTPATIENT)
Dept: ORTHOPEDIC SURGERY | Facility: CLINIC | Age: 68
End: 2023-04-10

## 2023-05-22 ENCOUNTER — NON-APPOINTMENT (OUTPATIENT)
Age: 68
End: 2023-05-22

## 2023-05-22 ENCOUNTER — APPOINTMENT (OUTPATIENT)
Dept: INTERNAL MEDICINE | Facility: CLINIC | Age: 68
End: 2023-05-22
Payer: MEDICARE

## 2023-05-22 VITALS
WEIGHT: 188 LBS | OXYGEN SATURATION: 98 % | HEART RATE: 80 BPM | SYSTOLIC BLOOD PRESSURE: 128 MMHG | DIASTOLIC BLOOD PRESSURE: 74 MMHG | TEMPERATURE: 97.1 F | RESPIRATION RATE: 17 BRPM | HEIGHT: 67 IN | BODY MASS INDEX: 29.51 KG/M2

## 2023-05-22 DIAGNOSIS — M25.561 PAIN IN RIGHT KNEE: ICD-10-CM

## 2023-05-22 DIAGNOSIS — M17.12 UNILATERAL PRIMARY OSTEOARTHRITIS, LEFT KNEE: ICD-10-CM

## 2023-05-22 DIAGNOSIS — M17.11 UNILATERAL PRIMARY OSTEOARTHRITIS, RIGHT KNEE: ICD-10-CM

## 2023-05-22 DIAGNOSIS — M25.562 PAIN IN RIGHT KNEE: ICD-10-CM

## 2023-05-22 DIAGNOSIS — H40.9 UNSPECIFIED GLAUCOMA: ICD-10-CM

## 2023-05-22 DIAGNOSIS — M48.061 SPINAL STENOSIS, LUMBAR REGION WITHOUT NEUROGENIC CLAUDICATION: ICD-10-CM

## 2023-05-22 DIAGNOSIS — E66.3 OVERWEIGHT: ICD-10-CM

## 2023-05-22 PROCEDURE — 93000 ELECTROCARDIOGRAM COMPLETE: CPT

## 2023-05-22 PROCEDURE — 36415 COLL VENOUS BLD VENIPUNCTURE: CPT

## 2023-05-22 PROCEDURE — 99214 OFFICE O/P EST MOD 30 MIN: CPT | Mod: 25

## 2023-05-22 PROCEDURE — G0439: CPT

## 2023-05-22 NOTE — PHYSICAL EXAM
[No Acute Distress] : no acute distress [Well Nourished] : well nourished [Well-Appearing] : well-appearing [Normal Sclera/Conjunctiva] : normal sclera/conjunctiva [PERRL] : pupils equal round and reactive to light [EOMI] : extraocular movements intact [Normal Outer Ear/Nose] : the outer ears and nose were normal in appearance [Normal Oropharynx] : the oropharynx was normal [No JVD] : no jugular venous distention [No Lymphadenopathy] : no lymphadenopathy [Supple] : supple [Thyroid Normal, No Nodules] : the thyroid was normal and there were no nodules present [No Respiratory Distress] : no respiratory distress  [No Accessory Muscle Use] : no accessory muscle use [Clear to Auscultation] : lungs were clear to auscultation bilaterally [Normal Rate] : normal rate  [Regular Rhythm] : with a regular rhythm [Normal S1, S2] : normal S1 and S2 [No Murmur] : no murmur heard [No Carotid Bruits] : no carotid bruits [No Abdominal Bruit] : a ~M bruit was not heard ~T in the abdomen [No Varicosities] : no varicosities [Pedal Pulses Present] : the pedal pulses are present [No Edema] : there was no peripheral edema [No Palpable Aorta] : no palpable aorta [No Extremity Clubbing/Cyanosis] : no extremity clubbing/cyanosis [Declined Breast Exam] : declined breast exam  [Soft] : abdomen soft [Non Tender] : non-tender [Non-distended] : non-distended [No Masses] : no abdominal mass palpated [No HSM] : no HSM [Normal Bowel Sounds] : normal bowel sounds [Normal Posterior Cervical Nodes] : no posterior cervical lymphadenopathy [Normal Anterior Cervical Nodes] : no anterior cervical lymphadenopathy [No CVA Tenderness] : no CVA  tenderness [No Spinal Tenderness] : no spinal tenderness [Grossly Normal Strength/Tone] : grossly normal strength/tone [No Rash] : no rash [Coordination Grossly Intact] : coordination grossly intact [No Focal Deficits] : no focal deficits [Normal Gait] : normal gait [Deep Tendon Reflexes (DTR)] : deep tendon reflexes were 2+ and symmetric [Normal Affect] : the affect was normal [Normal Insight/Judgement] : insight and judgment were intact [de-identified] : overweight [de-identified] : bilat knees arthritic [de-identified] : chronic guttate hypomelanosis

## 2023-05-22 NOTE — HEALTH RISK ASSESSMENT
[Good] : ~his/her~  mood as  good [Yes] : Yes [Monthly or less (1 pt)] : Monthly or less (1 point) [1 or 2 (0 pts)] : 1 or 2 (0 points) [Never (0 pts)] : Never (0 points) [No] : In the past 12 months have you used drugs other than those required for medical reasons? No [No falls in past year] : Patient reported no falls in the past year [0] : 2) Feeling down, depressed, or hopeless: Not at all (0) [PHQ-2 Negative - No further assessment needed] : PHQ-2 Negative - No further assessment needed [None] : None [Alone] : lives alone [Retired] : retired [Single] : single [Fully functional (bathing, dressing, toileting, transferring, walking, feeding)] : Fully functional (bathing, dressing, toileting, transferring, walking, feeding) [Fully functional (using the telephone, shopping, preparing meals, housekeeping, doing laundry, using] : Fully functional and needs no help or supervision to perform IADLs (using the telephone, shopping, preparing meals, housekeeping, doing laundry, using transportation, managing medications and managing finances) [Name: ___] : Health Care Proxy's Name: [unfilled]  [Relationship: ___] : Relationship: [unfilled] [Never] : Never [Audit-CScore] : 1 [de-identified] : no exc. [de-identified] : healthy [[id number]] : 0 [Change in mental status noted] : No change in mental status noted [Language] : denies difficulty with language [Behavior] : denies difficulty with behavior [Handling Complex Tasks] : denies difficulty handling complex tasks [Reasoning] : denies difficulty with reasoning [Reports changes in hearing] : Reports no changes in hearing [Reports changes in vision] : Reports no changes in vision [Reports changes in dental health] : Reports no changes in dental health [AdvancecareDate] : 5/22/23

## 2023-05-22 NOTE — HISTORY OF PRESENT ILLNESS
[FreeTextEntry1] : atul [de-identified] : right sided abd discomfort- mild, intermitte, \par bilat feet neuropathy.\par bilat numbness in fingers\par right foot numbs up after driving for a while\par multinodular goiter- sono 10 2022 none meet bx criteria\par Otherwise feels good. Appet, sleep, bms, voiding, mood all ok.\par Reviewed all interim as well as relevant prior consultations, labs and radiological studies.\par \par

## 2023-05-22 NOTE — ASSESSMENT
[FreeTextEntry1] : Prediabetes- d/wl, chk labs\par Eagles syndrome- stable\par Glaucoma- stable, cont drops per optho\par Peripheral Neuropathy- discussed w/u. doesn’t bother her enough to pursue at this time\par Chronic Venous insuff- low Na diet, elevate, compression stockings\par Spider veins/ varicosities- referred to dr pierce\par DJD knees- sympt tx, ptx, saw ortho\par Thyroid Nodule- multinod goiter, none meet criteria for biopsy, sono 1-/22\par Lumbar spinal stenosis with radic sympt- discussed tx options, medrol prn, pain management. \par Tingling in hands- ddx- cts vs cervical radic. tried night braces without help. discussed tx options- medrol, pain management\par Right sided abd pain- colon neg, likely gas related vs muscle strain or pinched nerve. try probiotics, ptx. \par \par Pt. was encouraged to do all relevant screening tests including but not limited to mammogram, gyn visit, colonoscopy, bone density, annual skin exam.\par Discussed the importance and benefit of a healthy lifestyle including a heart healthy diet such as the Mediterranean diet and regular exercise as well as 7 hours of sleep nightly.\par \par \par \par \par \par

## 2023-05-29 LAB
25(OH)D3 SERPL-MCNC: 24.6 NG/ML
ALBUMIN SERPL ELPH-MCNC: 4.2 G/DL
ALP BLD-CCNC: 81 U/L
ALT SERPL-CCNC: 19 U/L
ANION GAP SERPL CALC-SCNC: 14 MMOL/L
APPEARANCE: CLEAR
AST SERPL-CCNC: 18 U/L
BACTERIA UR CULT: NORMAL
BACTERIA: NEGATIVE /HPF
BILIRUB SERPL-MCNC: 0.4 MG/DL
BILIRUBIN URINE: NEGATIVE
BLOOD URINE: NEGATIVE
BUN SERPL-MCNC: 10 MG/DL
CALCIUM SERPL-MCNC: 9.6 MG/DL
CAST: 0 /LPF
CHLORIDE SERPL-SCNC: 104 MMOL/L
CHOLEST SERPL-MCNC: 218 MG/DL
CO2 SERPL-SCNC: 24 MMOL/L
COLOR: YELLOW
CREAT SERPL-MCNC: 0.71 MG/DL
EGFR: 93 ML/MIN/1.73M2
EPITHELIAL CELLS: 1 /HPF
ESTIMATED AVERAGE GLUCOSE: 126 MG/DL
GLUCOSE QUALITATIVE U: NEGATIVE MG/DL
GLUCOSE SERPL-MCNC: 100 MG/DL
HBA1C MFR BLD HPLC: 6 %
HDLC SERPL-MCNC: 65 MG/DL
KETONES URINE: NEGATIVE MG/DL
LDLC SERPL CALC-MCNC: 130 MG/DL
LEUKOCYTE ESTERASE URINE: NEGATIVE
MICROSCOPIC-UA: NORMAL
NITRITE URINE: NEGATIVE
NONHDLC SERPL-MCNC: 153 MG/DL
PH URINE: 6
POTASSIUM SERPL-SCNC: 4.6 MMOL/L
PROT SERPL-MCNC: 7.1 G/DL
PROTEIN URINE: NEGATIVE MG/DL
RED BLOOD CELLS URINE: 0 /HPF
SODIUM SERPL-SCNC: 142 MMOL/L
SPECIFIC GRAVITY URINE: 1.01
T3 SERPL-MCNC: 128 NG/DL
T4 FREE SERPL-MCNC: 1.1 NG/DL
TRIGL SERPL-MCNC: 117 MG/DL
TSH SERPL-ACNC: 0.6 UIU/ML
UROBILINOGEN URINE: 0.2 MG/DL
VIT B12 SERPL-MCNC: 619 PG/ML
WHITE BLOOD CELLS URINE: 0 /HPF

## 2023-06-01 ENCOUNTER — NON-APPOINTMENT (OUTPATIENT)
Age: 68
End: 2023-06-01

## 2023-07-27 DIAGNOSIS — Z82.49 FAMILY HISTORY OF ISCHEMIC HEART DISEASE AND OTHER DISEASES OF THE CIRCULATORY SYSTEM: ICD-10-CM

## 2023-07-27 DIAGNOSIS — Z92.89 PERSONAL HISTORY OF OTHER MEDICAL TREATMENT: ICD-10-CM

## 2023-09-29 ENCOUNTER — APPOINTMENT (OUTPATIENT)
Dept: INTERNAL MEDICINE | Facility: CLINIC | Age: 68
End: 2023-09-29

## 2023-11-24 ENCOUNTER — APPOINTMENT (OUTPATIENT)
Dept: INTERNAL MEDICINE | Facility: CLINIC | Age: 68
End: 2023-11-24
Payer: MEDICARE

## 2023-11-24 VITALS
DIASTOLIC BLOOD PRESSURE: 82 MMHG | WEIGHT: 190 LBS | RESPIRATION RATE: 17 BRPM | HEIGHT: 67 IN | BODY MASS INDEX: 29.82 KG/M2 | OXYGEN SATURATION: 97 % | TEMPERATURE: 97.8 F | HEART RATE: 78 BPM | SYSTOLIC BLOOD PRESSURE: 128 MMHG

## 2023-11-24 DIAGNOSIS — R30.0 DYSURIA: ICD-10-CM

## 2023-11-24 DIAGNOSIS — R73.03 PREDIABETES.: ICD-10-CM

## 2023-11-24 PROCEDURE — 99214 OFFICE O/P EST MOD 30 MIN: CPT | Mod: 25

## 2023-11-24 PROCEDURE — 36415 COLL VENOUS BLD VENIPUNCTURE: CPT

## 2023-11-27 LAB
ALBUMIN SERPL ELPH-MCNC: 4.5 G/DL
ALP BLD-CCNC: 85 U/L
ALT SERPL-CCNC: 27 U/L
ANION GAP SERPL CALC-SCNC: 12 MMOL/L
APPEARANCE: CLEAR
AST SERPL-CCNC: 19 U/L
BACTERIA UR CULT: NORMAL
BACTERIA: NEGATIVE /HPF
BASOPHILS # BLD AUTO: 0.03 K/UL
BASOPHILS NFR BLD AUTO: 0.8 %
BILIRUB SERPL-MCNC: 0.4 MG/DL
BILIRUBIN URINE: NEGATIVE
BLOOD URINE: NEGATIVE
BUN SERPL-MCNC: 8 MG/DL
CALCIUM SERPL-MCNC: 9.4 MG/DL
CAST: 0 /LPF
CHLORIDE SERPL-SCNC: 102 MMOL/L
CHOLEST SERPL-MCNC: 239 MG/DL
CO2 SERPL-SCNC: 26 MMOL/L
COLOR: YELLOW
CREAT SERPL-MCNC: 0.65 MG/DL
EGFR: 95 ML/MIN/1.73M2
EOSINOPHIL # BLD AUTO: 0.04 K/UL
EOSINOPHIL NFR BLD AUTO: 1 %
EPITHELIAL CELLS: 1 /HPF
ESTIMATED AVERAGE GLUCOSE: 128 MG/DL
GLUCOSE QUALITATIVE U: NEGATIVE MG/DL
GLUCOSE SERPL-MCNC: 100 MG/DL
HBA1C MFR BLD HPLC: 6.1 %
HCT VFR BLD CALC: 42.3 %
HDLC SERPL-MCNC: 62 MG/DL
HGB BLD-MCNC: 13.8 G/DL
IMM GRANULOCYTES NFR BLD AUTO: 0.3 %
KETONES URINE: NEGATIVE MG/DL
LDLC SERPL CALC-MCNC: 154 MG/DL
LEUKOCYTE ESTERASE URINE: NEGATIVE
LYMPHOCYTES # BLD AUTO: 1.6 K/UL
LYMPHOCYTES NFR BLD AUTO: 40.4 %
MAN DIFF?: NORMAL
MCHC RBC-ENTMCNC: 30.4 PG
MCHC RBC-ENTMCNC: 32.6 GM/DL
MCV RBC AUTO: 93.2 FL
MICROSCOPIC-UA: NORMAL
MONOCYTES # BLD AUTO: 0.35 K/UL
MONOCYTES NFR BLD AUTO: 8.8 %
NEUTROPHILS # BLD AUTO: 1.93 K/UL
NEUTROPHILS NFR BLD AUTO: 48.7 %
NITRITE URINE: NEGATIVE
NONHDLC SERPL-MCNC: 176 MG/DL
PH URINE: 8
PLATELET # BLD AUTO: 258 K/UL
POTASSIUM SERPL-SCNC: 4.3 MMOL/L
PROT SERPL-MCNC: 7.1 G/DL
PROTEIN URINE: NEGATIVE MG/DL
RBC # BLD: 4.54 M/UL
RBC # FLD: 13.9 %
RED BLOOD CELLS URINE: 0 /HPF
SODIUM SERPL-SCNC: 140 MMOL/L
SPECIFIC GRAVITY URINE: 1.01
TRIGL SERPL-MCNC: 126 MG/DL
UROBILINOGEN URINE: 0.2 MG/DL
WBC # FLD AUTO: 3.96 K/UL
WHITE BLOOD CELLS URINE: 0 /HPF

## 2024-01-02 ENCOUNTER — APPOINTMENT (OUTPATIENT)
Dept: INTERNAL MEDICINE | Facility: CLINIC | Age: 69
End: 2024-01-02

## 2024-01-09 ENCOUNTER — APPOINTMENT (OUTPATIENT)
Dept: CARDIOLOGY | Facility: CLINIC | Age: 69
End: 2024-01-09
Payer: MEDICARE

## 2024-01-09 VITALS
BODY MASS INDEX: 29.51 KG/M2 | HEART RATE: 96 BPM | HEIGHT: 67 IN | OXYGEN SATURATION: 98 % | DIASTOLIC BLOOD PRESSURE: 68 MMHG | SYSTOLIC BLOOD PRESSURE: 124 MMHG | WEIGHT: 188 LBS

## 2024-01-09 DIAGNOSIS — Z82.49 FAMILY HISTORY OF ISCHEMIC HEART DISEASE AND OTHER DISEASES OF THE CIRCULATORY SYSTEM: ICD-10-CM

## 2024-01-09 DIAGNOSIS — E78.5 HYPERLIPIDEMIA, UNSPECIFIED: ICD-10-CM

## 2024-01-09 DIAGNOSIS — R01.1 CARDIAC MURMUR, UNSPECIFIED: ICD-10-CM

## 2024-01-09 PROCEDURE — 99214 OFFICE O/P EST MOD 30 MIN: CPT

## 2024-01-09 PROCEDURE — 93000 ELECTROCARDIOGRAM COMPLETE: CPT

## 2024-05-08 ENCOUNTER — APPOINTMENT (OUTPATIENT)
Dept: DERMATOLOGY | Facility: CLINIC | Age: 69
End: 2024-05-08
Payer: MEDICARE

## 2024-05-08 PROCEDURE — 99203 OFFICE O/P NEW LOW 30 MIN: CPT

## 2024-05-28 ENCOUNTER — APPOINTMENT (OUTPATIENT)
Dept: INTERNAL MEDICINE | Facility: CLINIC | Age: 69
End: 2024-05-28
Payer: MEDICARE

## 2024-05-28 ENCOUNTER — NON-APPOINTMENT (OUTPATIENT)
Age: 69
End: 2024-05-28

## 2024-05-28 VITALS
HEIGHT: 67 IN | OXYGEN SATURATION: 98 % | BODY MASS INDEX: 28.72 KG/M2 | SYSTOLIC BLOOD PRESSURE: 124 MMHG | TEMPERATURE: 97.7 F | RESPIRATION RATE: 17 BRPM | HEART RATE: 76 BPM | DIASTOLIC BLOOD PRESSURE: 72 MMHG | WEIGHT: 183 LBS

## 2024-05-28 DIAGNOSIS — E04.1 NONTOXIC SINGLE THYROID NODULE: ICD-10-CM

## 2024-05-28 DIAGNOSIS — Z91.89 OTHER SPECIFIED PERSONAL RISK FACTORS, NOT ELSEWHERE CLASSIFIED: ICD-10-CM

## 2024-05-28 DIAGNOSIS — E78.00 PURE HYPERCHOLESTEROLEMIA, UNSPECIFIED: ICD-10-CM

## 2024-05-28 DIAGNOSIS — N39.46 MIXED INCONTINENCE: ICD-10-CM

## 2024-05-28 DIAGNOSIS — E66.9 OBESITY, UNSPECIFIED: ICD-10-CM

## 2024-05-28 DIAGNOSIS — Z00.00 ENCOUNTER FOR GENERAL ADULT MEDICAL EXAMINATION W/OUT ABNORMAL FINDINGS: ICD-10-CM

## 2024-05-28 DIAGNOSIS — M24.20 DISORDER OF LIGAMENT, UNSPECIFIED SITE: ICD-10-CM

## 2024-05-28 PROCEDURE — 99214 OFFICE O/P EST MOD 30 MIN: CPT | Mod: 25

## 2024-05-28 PROCEDURE — G0439: CPT

## 2024-05-28 PROCEDURE — 36415 COLL VENOUS BLD VENIPUNCTURE: CPT

## 2024-05-28 NOTE — PHYSICAL EXAM
[No Acute Distress] : no acute distress [Well Nourished] : well nourished [Well Developed] : well developed [Well-Appearing] : well-appearing [Normal Sclera/Conjunctiva] : normal sclera/conjunctiva [PERRL] : pupils equal round and reactive to light [EOMI] : extraocular movements intact [Normal Outer Ear/Nose] : the outer ears and nose were normal in appearance [Normal Oropharynx] : the oropharynx was normal [No JVD] : no jugular venous distention [No Lymphadenopathy] : no lymphadenopathy [Supple] : supple [No Respiratory Distress] : no respiratory distress  [No Accessory Muscle Use] : no accessory muscle use [Clear to Auscultation] : lungs were clear to auscultation bilaterally [Normal Rate] : normal rate  [Regular Rhythm] : with a regular rhythm [Normal S1, S2] : normal S1 and S2 [No Murmur] : no murmur heard [No Carotid Bruits] : no carotid bruits [No Abdominal Bruit] : a ~M bruit was not heard ~T in the abdomen [No Varicosities] : no varicosities [Pedal Pulses Present] : the pedal pulses are present [No Edema] : there was no peripheral edema [No Palpable Aorta] : no palpable aorta [No Extremity Clubbing/Cyanosis] : no extremity clubbing/cyanosis [Soft] : abdomen soft [Non Tender] : non-tender [Non-distended] : non-distended [No Masses] : no abdominal mass palpated [No HSM] : no HSM [Normal Bowel Sounds] : normal bowel sounds [Normal Posterior Cervical Nodes] : no posterior cervical lymphadenopathy [Normal Anterior Cervical Nodes] : no anterior cervical lymphadenopathy [No CVA Tenderness] : no CVA  tenderness [No Spinal Tenderness] : no spinal tenderness [No Joint Swelling] : no joint swelling [Grossly Normal Strength/Tone] : grossly normal strength/tone [No Rash] : no rash [Coordination Grossly Intact] : coordination grossly intact [No Focal Deficits] : no focal deficits [Normal Gait] : normal gait [Deep Tendon Reflexes (DTR)] : deep tendon reflexes were 2+ and symmetric [Normal Affect] : the affect was normal [Normal Insight/Judgement] : insight and judgment were intact [de-identified] : right thyroid nodule [de-identified] : gyn [de-identified] : foot- callous on pad just inferior to 2nd and 3rd toes.

## 2024-05-28 NOTE — HEALTH RISK ASSESSMENT
[Good] : ~his/her~  mood as  good [Yes] : Yes [Monthly or less (1 pt)] : Monthly or less (1 point) [1 or 2 (0 pts)] : 1 or 2 (0 points) [Never (0 pts)] : Never (0 points) [No] : In the past 12 months have you used drugs other than those required for medical reasons? No [No falls in past year] : Patient reported no falls in the past year [0] : 2) Feeling down, depressed, or hopeless: Not at all (0) [PHQ-2 Negative - No further assessment needed] : PHQ-2 Negative - No further assessment needed [Never] : Never [Patient reported mammogram was normal] : Patient reported mammogram was normal [Patient reported bone density results were abnormal] : Patient reported bone density results were abnormal [Patient reported colonoscopy was abnormal] : Patient reported colonoscopy was abnormal [None] : None [Alone] : lives alone [Retired] : retired [Single] : single [Fully functional (bathing, dressing, toileting, transferring, walking, feeding)] : Fully functional (bathing, dressing, toileting, transferring, walking, feeding) [Fully functional (using the telephone, shopping, preparing meals, housekeeping, doing laundry, using] : Fully functional and needs no help or supervision to perform IADLs (using the telephone, shopping, preparing meals, housekeeping, doing laundry, using transportation, managing medications and managing finances) [Audit-CScore] : 1 [de-identified] : no exc. [de-identified] : reg.  [[id number]] : 0 [Change in mental status noted] : No change in mental status noted [Language] : denies difficulty with language [Behavior] : denies difficulty with behavior [Handling Complex Tasks] : denies difficulty handling complex tasks [Reasoning] : denies difficulty with reasoning [Reports changes in hearing] : Reports no changes in hearing [Reports changes in vision] : Reports no changes in vision [Reports changes in dental health] : Reports no changes in dental health [MammogramDate] : 12/2023 [BoneDensityDate] : 2022 [ColonoscopyDate] : 8/2022 [ColonoscopyComments] : polyp [AdvancecareDate] : 5/28/24

## 2024-05-28 NOTE — HISTORY OF PRESENT ILLNESS
[FreeTextEntry1] : atul [de-identified] : c./o bilat foot pain- soles Otherwise feels good. Appet, sleep, bms, voiding, mood all ok.  Reviewed all interim as well as relevant prior consultations, labs and radiological studies.  sees cardio, pulm, ent. urol

## 2024-05-28 NOTE — ASSESSMENT
[FreeTextEntry1] : prediabetes- d/e/wl, chk labs glaucoma- sees optho, on drops thyroid nodules-  multiple bilat, tr4s. 1.8 cm greatest . seeing endo this week, dr pineda incr cad risk, 11%- discussed risk reduction with statin at length. she will think about it. cardio noted.  foot pain- likely from callous - pod eval. Discussed healthy lifestyle,discussed and updated vaccinations, healthy diet, exercise, chk labs, discussed appropriate screening tests including colonoscopy, mammo, bone density and pap.  Discussed the importance and benefit of a healthy lifestyle including a heart healthy diet such as the Mediterranean diet and regular exercise as well as 7 hours of sleep nightly.  Discussed the importance of screening for colon cancer. Reviewed screening reccomendations including colonoscopy, Cologuard and Fit DNA testing. I strongly encouraged colonoscopy as that is the best screening test to detect both colon cancer and polyps and is the gold standard.

## 2024-05-30 LAB
25(OH)D3 SERPL-MCNC: 25.1 NG/ML
ALBUMIN SERPL ELPH-MCNC: 4.7 G/DL
ALP BLD-CCNC: 90 U/L
ALT SERPL-CCNC: 22 U/L
ANION GAP SERPL CALC-SCNC: 18 MMOL/L
APPEARANCE: CLEAR
AST SERPL-CCNC: 22 U/L
BACTERIA: NEGATIVE /HPF
BASOPHILS # BLD AUTO: 0.04 K/UL
BASOPHILS NFR BLD AUTO: 1 %
BILIRUB SERPL-MCNC: 0.3 MG/DL
BILIRUBIN URINE: NEGATIVE
BLOOD URINE: NEGATIVE
BUN SERPL-MCNC: 8 MG/DL
CALCIUM SERPL-MCNC: 9.7 MG/DL
CAST: 0 /LPF
CHLORIDE SERPL-SCNC: 104 MMOL/L
CHOLEST SERPL-MCNC: 227 MG/DL
CO2 SERPL-SCNC: 23 MMOL/L
COLOR: YELLOW
CREAT SERPL-MCNC: 0.76 MG/DL
EGFR: 85 ML/MIN/1.73M2
EOSINOPHIL # BLD AUTO: 0.04 K/UL
EOSINOPHIL NFR BLD AUTO: 1 %
EPITHELIAL CELLS: 1 /HPF
ESTIMATED AVERAGE GLUCOSE: 123 MG/DL
GLUCOSE QUALITATIVE U: NEGATIVE MG/DL
GLUCOSE SERPL-MCNC: 101 MG/DL
HBA1C MFR BLD HPLC: 5.9 %
HCT VFR BLD CALC: 43.1 %
HDLC SERPL-MCNC: 65 MG/DL
HGB BLD-MCNC: 13.8 G/DL
IMM GRANULOCYTES NFR BLD AUTO: 0 %
KETONES URINE: NEGATIVE MG/DL
LDLC SERPL CALC-MCNC: 139 MG/DL
LEUKOCYTE ESTERASE URINE: NEGATIVE
LYMPHOCYTES # BLD AUTO: 1.62 K/UL
LYMPHOCYTES NFR BLD AUTO: 41.9 %
MAN DIFF?: NORMAL
MCHC RBC-ENTMCNC: 30.5 PG
MCHC RBC-ENTMCNC: 32 GM/DL
MCV RBC AUTO: 95.1 FL
MICROSCOPIC-UA: NORMAL
MONOCYTES # BLD AUTO: 0.25 K/UL
MONOCYTES NFR BLD AUTO: 6.5 %
NEUTROPHILS # BLD AUTO: 1.92 K/UL
NEUTROPHILS NFR BLD AUTO: 49.6 %
NITRITE URINE: NEGATIVE
NONHDLC SERPL-MCNC: 162 MG/DL
PH URINE: 6.5
PLATELET # BLD AUTO: 248 K/UL
POTASSIUM SERPL-SCNC: 4.5 MMOL/L
PROT SERPL-MCNC: 7.4 G/DL
PROTEIN URINE: NEGATIVE MG/DL
RBC # BLD: 4.53 M/UL
RBC # FLD: 14.6 %
RED BLOOD CELLS URINE: 0 /HPF
SODIUM SERPL-SCNC: 145 MMOL/L
SPECIFIC GRAVITY URINE: 1.02
T3 SERPL-MCNC: 137 NG/DL
T4 FREE SERPL-MCNC: 1.2 NG/DL
TRIGL SERPL-MCNC: 128 MG/DL
TSH SERPL-ACNC: 0.83 UIU/ML
UROBILINOGEN URINE: 0.2 MG/DL
VIT B12 SERPL-MCNC: 479 PG/ML
WBC # FLD AUTO: 3.87 K/UL
WHITE BLOOD CELLS URINE: 0 /HPF

## 2024-06-02 LAB — BACTERIA UR CULT: ABNORMAL

## 2024-07-11 ENCOUNTER — APPOINTMENT (OUTPATIENT)
Dept: INTERNAL MEDICINE | Facility: CLINIC | Age: 69
End: 2024-07-11
Payer: MEDICARE

## 2024-07-11 VITALS
RESPIRATION RATE: 18 BRPM | BODY MASS INDEX: 28.25 KG/M2 | WEIGHT: 180 LBS | HEART RATE: 89 BPM | SYSTOLIC BLOOD PRESSURE: 118 MMHG | HEIGHT: 67 IN | TEMPERATURE: 97.4 F | OXYGEN SATURATION: 98 % | DIASTOLIC BLOOD PRESSURE: 80 MMHG

## 2024-07-11 DIAGNOSIS — U07.1 COVID-19: ICD-10-CM

## 2024-07-11 PROCEDURE — G2211 COMPLEX E/M VISIT ADD ON: CPT

## 2024-07-11 PROCEDURE — 99213 OFFICE O/P EST LOW 20 MIN: CPT

## 2024-07-11 RX ORDER — NIRMATRELVIR AND RITONAVIR 300-100 MG
20 X 150 MG & KIT ORAL
Qty: 1 | Refills: 0 | Status: ACTIVE | COMMUNITY
Start: 2024-07-11 | End: 1900-01-01

## 2024-08-26 ENCOUNTER — APPOINTMENT (OUTPATIENT)
Dept: INTERNAL MEDICINE | Facility: CLINIC | Age: 69
End: 2024-08-26
Payer: COMMERCIAL

## 2024-08-26 VITALS
SYSTOLIC BLOOD PRESSURE: 130 MMHG | RESPIRATION RATE: 17 BRPM | OXYGEN SATURATION: 98 % | BODY MASS INDEX: 29.35 KG/M2 | HEIGHT: 67 IN | HEART RATE: 80 BPM | DIASTOLIC BLOOD PRESSURE: 80 MMHG | WEIGHT: 187 LBS | TEMPERATURE: 97.8 F

## 2024-08-26 DIAGNOSIS — M54.2 CERVICALGIA: ICD-10-CM

## 2024-08-26 DIAGNOSIS — S20.311A: ICD-10-CM

## 2024-08-26 PROCEDURE — 99204 OFFICE O/P NEW MOD 45 MIN: CPT

## 2024-08-26 NOTE — ASSESSMENT
[FreeTextEntry1] : advil prn, heat.  Monitor symptoms and report any changes or concerns.  Total time 30 min ( 20 min. FTF and 10 min chart review and documentation.)

## 2024-08-26 NOTE — PHYSICAL EXAM
[No Acute Distress] : no acute distress [Well Nourished] : well nourished [Well Developed] : well developed [Well-Appearing] : well-appearing [Normal Sclera/Conjunctiva] : normal sclera/conjunctiva [PERRL] : pupils equal round and reactive to light [EOMI] : extraocular movements intact [Normal Outer Ear/Nose] : the outer ears and nose were normal in appearance [Normal Oropharynx] : the oropharynx was normal [No JVD] : no jugular venous distention [No Lymphadenopathy] : no lymphadenopathy [Supple] : supple [Thyroid Normal, No Nodules] : the thyroid was normal and there were no nodules present [No Respiratory Distress] : no respiratory distress  [No Accessory Muscle Use] : no accessory muscle use [Clear to Auscultation] : lungs were clear to auscultation bilaterally [Normal Rate] : normal rate  [Regular Rhythm] : with a regular rhythm [Normal S1, S2] : normal S1 and S2 [No Murmur] : no murmur heard [No Carotid Bruits] : no carotid bruits [No Abdominal Bruit] : a ~M bruit was not heard ~T in the abdomen [No Varicosities] : no varicosities [Pedal Pulses Present] : the pedal pulses are present [No Edema] : there was no peripheral edema [No Palpable Aorta] : no palpable aorta [No Extremity Clubbing/Cyanosis] : no extremity clubbing/cyanosis [Soft] : abdomen soft [Non Tender] : non-tender [Non-distended] : non-distended [No Masses] : no abdominal mass palpated [No HSM] : no HSM [Normal Bowel Sounds] : normal bowel sounds [Normal Posterior Cervical Nodes] : no posterior cervical lymphadenopathy [Normal Anterior Cervical Nodes] : no anterior cervical lymphadenopathy [No CVA Tenderness] : no CVA  tenderness [No Spinal Tenderness] : no spinal tenderness [No Joint Swelling] : no joint swelling [Grossly Normal Strength/Tone] : grossly normal strength/tone [No Rash] : no rash [Coordination Grossly Intact] : coordination grossly intact [No Focal Deficits] : no focal deficits [Normal Gait] : normal gait [Deep Tendon Reflexes (DTR)] : deep tendon reflexes were 2+ and symmetric [Normal Affect] : the affect was normal [Normal Insight/Judgement] : insight and judgment were intact [de-identified] : paracervical spasm. [de-identified] : right upper chest abrasion,
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
## 2024-11-12 ENCOUNTER — APPOINTMENT (OUTPATIENT)
Dept: INTERNAL MEDICINE | Facility: CLINIC | Age: 69
End: 2024-11-12
Payer: MEDICARE

## 2024-11-12 VITALS
TEMPERATURE: 97.1 F | HEART RATE: 83 BPM | WEIGHT: 180 LBS | HEIGHT: 67 IN | OXYGEN SATURATION: 96 % | BODY MASS INDEX: 28.25 KG/M2 | SYSTOLIC BLOOD PRESSURE: 124 MMHG | RESPIRATION RATE: 17 BRPM | DIASTOLIC BLOOD PRESSURE: 80 MMHG

## 2024-11-12 DIAGNOSIS — H40.9 UNSPECIFIED GLAUCOMA: ICD-10-CM

## 2024-11-12 DIAGNOSIS — M24.20 DISORDER OF LIGAMENT, UNSPECIFIED SITE: ICD-10-CM

## 2024-11-12 DIAGNOSIS — M17.12 UNILATERAL PRIMARY OSTEOARTHRITIS, LEFT KNEE: ICD-10-CM

## 2024-11-12 DIAGNOSIS — G62.9 POLYNEUROPATHY, UNSPECIFIED: ICD-10-CM

## 2024-11-12 DIAGNOSIS — M47.812 SPONDYLOSIS W/OUT MYELOPATHY OR RADICULOPATHY, CERVICAL REGION: ICD-10-CM

## 2024-11-12 DIAGNOSIS — M48.061 SPINAL STENOSIS, LUMBAR REGION WITHOUT NEUROGENIC CLAUDICATION: ICD-10-CM

## 2024-11-12 PROCEDURE — 99214 OFFICE O/P EST MOD 30 MIN: CPT

## 2024-11-12 PROCEDURE — G2211 COMPLEX E/M VISIT ADD ON: CPT

## 2024-11-12 PROCEDURE — 36415 COLL VENOUS BLD VENIPUNCTURE: CPT

## 2024-11-16 LAB
25(OH)D3 SERPL-MCNC: 29 NG/ML
ALBUMIN SERPL ELPH-MCNC: 4.3 G/DL
ALP BLD-CCNC: 78 U/L
ALT SERPL-CCNC: 23 U/L
ANION GAP SERPL CALC-SCNC: 14 MMOL/L
APPEARANCE: CLEAR
AST SERPL-CCNC: 22 U/L
BASOPHILS # BLD AUTO: 0.04 K/UL
BASOPHILS NFR BLD AUTO: 1 %
BILIRUB SERPL-MCNC: 0.4 MG/DL
BILIRUBIN URINE: NEGATIVE
BLOOD URINE: NEGATIVE
BUN SERPL-MCNC: 10 MG/DL
CALCIUM SERPL-MCNC: 9.7 MG/DL
CHLORIDE SERPL-SCNC: 102 MMOL/L
CHOLEST SERPL-MCNC: 241 MG/DL
CO2 SERPL-SCNC: 24 MMOL/L
COLOR: YELLOW
CREAT SERPL-MCNC: 0.76 MG/DL
EGFR: 84 ML/MIN/1.73M2
EOSINOPHIL # BLD AUTO: 0.03 K/UL
EOSINOPHIL NFR BLD AUTO: 0.8 %
ESTIMATED AVERAGE GLUCOSE: 128 MG/DL
GLUCOSE QUALITATIVE U: NEGATIVE MG/DL
GLUCOSE SERPL-MCNC: 99 MG/DL
HBA1C MFR BLD HPLC: 6.1 %
HCT VFR BLD CALC: 43.2 %
HDLC SERPL-MCNC: 63 MG/DL
HGB BLD-MCNC: 13.9 G/DL
IMM GRANULOCYTES NFR BLD AUTO: 0 %
KETONES URINE: NEGATIVE MG/DL
LDLC SERPL CALC-MCNC: 156 MG/DL
LEUKOCYTE ESTERASE URINE: NEGATIVE
LYMPHOCYTES # BLD AUTO: 1.72 K/UL
LYMPHOCYTES NFR BLD AUTO: 43.3 %
MAN DIFF?: NORMAL
MCHC RBC-ENTMCNC: 30.3 PG
MCHC RBC-ENTMCNC: 32.2 G/DL
MCV RBC AUTO: 94.3 FL
MONOCYTES # BLD AUTO: 0.34 K/UL
MONOCYTES NFR BLD AUTO: 8.6 %
NEUTROPHILS # BLD AUTO: 1.84 K/UL
NEUTROPHILS NFR BLD AUTO: 46.3 %
NITRITE URINE: NEGATIVE
NONHDLC SERPL-MCNC: 179 MG/DL
PH URINE: 7.5
PLATELET # BLD AUTO: 250 K/UL
POTASSIUM SERPL-SCNC: 4 MMOL/L
PROT SERPL-MCNC: 7.2 G/DL
PROTEIN URINE: NEGATIVE MG/DL
RBC # BLD: 4.58 M/UL
RBC # FLD: 14.6 %
SODIUM SERPL-SCNC: 140 MMOL/L
SPECIFIC GRAVITY URINE: 1.02
T3 SERPL-MCNC: 119 NG/DL
T4 FREE SERPL-MCNC: 1.2 NG/DL
TRIGL SERPL-MCNC: 131 MG/DL
TSH SERPL-ACNC: 0.86 UIU/ML
UROBILINOGEN URINE: 1 MG/DL
VIT B12 SERPL-MCNC: 454 PG/ML
WBC # FLD AUTO: 3.97 K/UL

## 2024-11-20 ENCOUNTER — APPOINTMENT (OUTPATIENT)
Dept: CARDIOLOGY | Facility: CLINIC | Age: 69
End: 2024-11-20
Payer: MEDICARE

## 2024-11-20 VITALS
HEART RATE: 87 BPM | SYSTOLIC BLOOD PRESSURE: 124 MMHG | OXYGEN SATURATION: 98 % | DIASTOLIC BLOOD PRESSURE: 74 MMHG | WEIGHT: 183 LBS | BODY MASS INDEX: 28.72 KG/M2 | HEIGHT: 67 IN

## 2024-11-20 DIAGNOSIS — R06.02 SHORTNESS OF BREATH: ICD-10-CM

## 2024-11-20 DIAGNOSIS — M79.602 PAIN IN LEFT ARM: ICD-10-CM

## 2024-11-20 DIAGNOSIS — E78.00 PURE HYPERCHOLESTEROLEMIA, UNSPECIFIED: ICD-10-CM

## 2024-11-20 DIAGNOSIS — R01.1 CARDIAC MURMUR, UNSPECIFIED: ICD-10-CM

## 2024-11-20 PROCEDURE — 99214 OFFICE O/P EST MOD 30 MIN: CPT

## 2024-11-20 PROCEDURE — 93000 ELECTROCARDIOGRAM COMPLETE: CPT

## 2024-12-04 ENCOUNTER — APPOINTMENT (OUTPATIENT)
Dept: CARDIOLOGY | Facility: CLINIC | Age: 69
End: 2024-12-04
Payer: MEDICARE

## 2024-12-04 PROCEDURE — 93306 TTE W/DOPPLER COMPLETE: CPT

## 2024-12-19 ENCOUNTER — APPOINTMENT (OUTPATIENT)
Dept: OBGYN | Facility: CLINIC | Age: 69
End: 2024-12-19
Payer: MEDICARE

## 2024-12-19 VITALS
DIASTOLIC BLOOD PRESSURE: 77 MMHG | HEART RATE: 104 BPM | OXYGEN SATURATION: 97 % | SYSTOLIC BLOOD PRESSURE: 114 MMHG | HEIGHT: 67 IN | BODY MASS INDEX: 29.82 KG/M2 | WEIGHT: 190 LBS

## 2024-12-19 DIAGNOSIS — M85.80 OTHER SPECIFIED DISORDERS OF BONE DENSITY AND STRUCTURE, UNSPECIFIED SITE: ICD-10-CM

## 2024-12-19 DIAGNOSIS — Z01.419 ENCOUNTER FOR GYNECOLOGICAL EXAMINATION (GENERAL) (ROUTINE) W/OUT ABNORMAL FINDINGS: ICD-10-CM

## 2024-12-19 DIAGNOSIS — R82.90 UNSPECIFIED ABNORMAL FINDINGS IN URINE: ICD-10-CM

## 2024-12-19 DIAGNOSIS — R32 UNSPECIFIED URINARY INCONTINENCE: ICD-10-CM

## 2024-12-19 PROCEDURE — 99203 OFFICE O/P NEW LOW 30 MIN: CPT | Mod: 25

## 2024-12-19 PROCEDURE — G0101: CPT

## 2024-12-19 PROCEDURE — 81003 URINALYSIS AUTO W/O SCOPE: CPT | Mod: QW

## 2024-12-19 PROCEDURE — 99459 PELVIC EXAMINATION: CPT

## 2024-12-20 LAB
BILIRUB UR QL STRIP: NEGATIVE
GLUCOSE UR-MCNC: NEGATIVE
HCG UR QL: 0.2 EU/DL
HGB UR QL STRIP.AUTO: NEGATIVE
KETONES UR-MCNC: NEGATIVE
LEUKOCYTE ESTERASE UR QL STRIP: NORMAL
NITRITE UR QL STRIP: NEGATIVE
PH UR STRIP: 5.5
PROT UR STRIP-MCNC: NEGATIVE
SP GR UR STRIP: 1.01

## 2024-12-21 LAB
APPEARANCE: CLEAR
BACTERIA: NEGATIVE /HPF
BILIRUBIN URINE: NEGATIVE
BLOOD URINE: NEGATIVE
CAST: 0 /LPF
COLOR: YELLOW
EPITHELIAL CELLS: 2 /HPF
GLUCOSE QUALITATIVE U: NEGATIVE MG/DL
HPV HIGH+LOW RISK DNA PNL CVX: NOT DETECTED
KETONES URINE: NEGATIVE MG/DL
LEUKOCYTE ESTERASE URINE: ABNORMAL
MICROSCOPIC-UA: NORMAL
NITRITE URINE: NEGATIVE
PH URINE: 6.5
PROTEIN URINE: NEGATIVE MG/DL
RED BLOOD CELLS URINE: 0 /HPF
SPECIFIC GRAVITY URINE: 1.01
UROBILINOGEN URINE: 0.2 MG/DL
WHITE BLOOD CELLS URINE: 1 /HPF

## 2024-12-23 LAB — BACTERIA UR CULT: ABNORMAL

## 2024-12-23 RX ORDER — SULFAMETHOXAZOLE AND TRIMETHOPRIM 800; 160 MG/1; MG/1
800-160 TABLET ORAL TWICE DAILY
Qty: 6 | Refills: 0 | Status: ACTIVE | COMMUNITY
Start: 2024-12-23 | End: 1900-01-01

## 2024-12-28 LAB — CYTOLOGY CVX/VAG DOC THIN PREP: ABNORMAL

## 2025-01-09 ENCOUNTER — APPOINTMENT (OUTPATIENT)
Dept: CARDIOLOGY | Facility: CLINIC | Age: 70
End: 2025-01-09

## 2025-01-10 ENCOUNTER — APPOINTMENT (OUTPATIENT)
Dept: CARDIOLOGY | Facility: CLINIC | Age: 70
End: 2025-01-10
Payer: MEDICARE

## 2025-01-10 PROCEDURE — 78452 HT MUSCLE IMAGE SPECT MULT: CPT

## 2025-01-10 PROCEDURE — A9502: CPT

## 2025-01-10 PROCEDURE — 93015 CV STRESS TEST SUPVJ I&R: CPT

## 2025-01-14 ENCOUNTER — APPOINTMENT (OUTPATIENT)
Dept: CARDIOLOGY | Facility: CLINIC | Age: 70
End: 2025-01-14
Payer: MEDICARE

## 2025-01-14 ENCOUNTER — APPOINTMENT (OUTPATIENT)
Dept: OBGYN | Facility: CLINIC | Age: 70
End: 2025-01-14

## 2025-01-14 VITALS
BODY MASS INDEX: 28.88 KG/M2 | HEART RATE: 78 BPM | HEIGHT: 67 IN | OXYGEN SATURATION: 98 % | WEIGHT: 184 LBS | DIASTOLIC BLOOD PRESSURE: 70 MMHG | SYSTOLIC BLOOD PRESSURE: 132 MMHG

## 2025-01-14 DIAGNOSIS — E78.5 HYPERLIPIDEMIA, UNSPECIFIED: ICD-10-CM

## 2025-01-14 DIAGNOSIS — R06.09 OTHER FORMS OF DYSPNEA: ICD-10-CM

## 2025-01-14 DIAGNOSIS — M79.603 PAIN IN ARM, UNSPECIFIED: ICD-10-CM

## 2025-01-14 PROCEDURE — 99214 OFFICE O/P EST MOD 30 MIN: CPT

## 2025-02-18 ENCOUNTER — APPOINTMENT (OUTPATIENT)
Dept: INTERNAL MEDICINE | Facility: CLINIC | Age: 70
End: 2025-02-18
Payer: MEDICARE

## 2025-02-18 VITALS
HEART RATE: 77 BPM | TEMPERATURE: 98.1 F | RESPIRATION RATE: 18 BRPM | SYSTOLIC BLOOD PRESSURE: 124 MMHG | WEIGHT: 185 LBS | OXYGEN SATURATION: 99 % | HEIGHT: 67 IN | BODY MASS INDEX: 29.03 KG/M2 | DIASTOLIC BLOOD PRESSURE: 80 MMHG

## 2025-02-18 DIAGNOSIS — K21.9 GASTRO-ESOPHAGEAL REFLUX DISEASE W/OUT ESOPHAGITIS: ICD-10-CM

## 2025-02-18 DIAGNOSIS — E78.00 PURE HYPERCHOLESTEROLEMIA, UNSPECIFIED: ICD-10-CM

## 2025-02-18 DIAGNOSIS — E04.1 NONTOXIC SINGLE THYROID NODULE: ICD-10-CM

## 2025-02-18 DIAGNOSIS — R82.90 UNSPECIFIED ABNORMAL FINDINGS IN URINE: ICD-10-CM

## 2025-02-18 DIAGNOSIS — J11.1 INFLUENZA DUE TO UNIDENTIFIED INFLUENZA VIRUS WITH OTHER RESPIRATORY MANIFESTATIONS: ICD-10-CM

## 2025-02-18 DIAGNOSIS — R73.03 PREDIABETES.: ICD-10-CM

## 2025-02-18 PROCEDURE — 99204 OFFICE O/P NEW MOD 45 MIN: CPT

## 2025-02-18 PROCEDURE — G2211 COMPLEX E/M VISIT ADD ON: CPT

## 2025-03-25 ENCOUNTER — APPOINTMENT (OUTPATIENT)
Age: 70
End: 2025-03-25

## 2025-03-27 ENCOUNTER — APPOINTMENT (OUTPATIENT)
Dept: INTERNAL MEDICINE | Facility: CLINIC | Age: 70
End: 2025-03-27
Payer: MEDICARE

## 2025-03-27 VITALS
WEIGHT: 184.9 LBS | HEIGHT: 67 IN | HEART RATE: 76 BPM | BODY MASS INDEX: 29.02 KG/M2 | TEMPERATURE: 98 F | OXYGEN SATURATION: 96 % | DIASTOLIC BLOOD PRESSURE: 82 MMHG | SYSTOLIC BLOOD PRESSURE: 128 MMHG | RESPIRATION RATE: 18 BRPM

## 2025-03-27 DIAGNOSIS — R73.03 PREDIABETES.: ICD-10-CM

## 2025-03-27 DIAGNOSIS — M54.50 LOW BACK PAIN, UNSPECIFIED: ICD-10-CM

## 2025-03-27 DIAGNOSIS — J02.9 ACUTE PHARYNGITIS, UNSPECIFIED: ICD-10-CM

## 2025-03-27 DIAGNOSIS — M85.80 OTHER SPECIFIED DISORDERS OF BONE DENSITY AND STRUCTURE, UNSPECIFIED SITE: ICD-10-CM

## 2025-03-27 DIAGNOSIS — E04.1 NONTOXIC SINGLE THYROID NODULE: ICD-10-CM

## 2025-03-27 DIAGNOSIS — M51.9 UNSPECIFIED THORACIC, THORACOLUMBAR AND LUMBOSACRAL INTERVERTEBRAL DISC DISORDER: ICD-10-CM

## 2025-03-27 DIAGNOSIS — E78.5 HYPERLIPIDEMIA, UNSPECIFIED: ICD-10-CM

## 2025-03-27 PROCEDURE — 87880 STREP A ASSAY W/OPTIC: CPT | Mod: QW

## 2025-03-27 PROCEDURE — 99214 OFFICE O/P EST MOD 30 MIN: CPT

## 2025-03-27 PROCEDURE — 87811 SARS-COV-2 COVID19 W/OPTIC: CPT | Mod: QW

## 2025-03-27 PROCEDURE — G2211 COMPLEX E/M VISIT ADD ON: CPT

## 2025-03-27 PROCEDURE — 36415 COLL VENOUS BLD VENIPUNCTURE: CPT

## 2025-04-03 LAB
ALBUMIN SERPL ELPH-MCNC: 4.2 G/DL
ALP BLD-CCNC: 81 U/L
ALT SERPL-CCNC: 19 U/L
ANION GAP SERPL CALC-SCNC: 13 MMOL/L
APPEARANCE: CLEAR
AST SERPL-CCNC: 20 U/L
BASOPHILS # BLD AUTO: 0.04 K/UL
BASOPHILS NFR BLD AUTO: 1 %
BILIRUB SERPL-MCNC: 0.4 MG/DL
BILIRUBIN URINE: NEGATIVE
BLOOD URINE: NEGATIVE
BUN SERPL-MCNC: 9 MG/DL
CALCIUM SERPL-MCNC: 9.7 MG/DL
CHLORIDE SERPL-SCNC: 105 MMOL/L
CHOLEST SERPL-MCNC: 224 MG/DL
CO2 SERPL-SCNC: 25 MMOL/L
COLOR: YELLOW
CREAT SERPL-MCNC: 0.65 MG/DL
EGFRCR SERPLBLD CKD-EPI 2021: 95 ML/MIN/1.73M2
EOSINOPHIL # BLD AUTO: 0.02 K/UL
EOSINOPHIL NFR BLD AUTO: 0.5 %
ESTIMATED AVERAGE GLUCOSE: 131 MG/DL
GLUCOSE QUALITATIVE U: NEGATIVE MG/DL
GLUCOSE SERPL-MCNC: 96 MG/DL
HBA1C MFR BLD HPLC: 6.2 %
HCT VFR BLD CALC: 41.7 %
HDLC SERPL-MCNC: 65 MG/DL
HGB BLD-MCNC: 14 G/DL
IMM GRANULOCYTES NFR BLD AUTO: 0.2 %
KETONES URINE: NEGATIVE MG/DL
LDLC SERPL-MCNC: 140 MG/DL
LEUKOCYTE ESTERASE URINE: NEGATIVE
LYMPHOCYTES # BLD AUTO: 1.76 K/UL
LYMPHOCYTES NFR BLD AUTO: 43.2 %
MAN DIFF?: NORMAL
MCHC RBC-ENTMCNC: 31 PG
MCHC RBC-ENTMCNC: 33.6 G/DL
MCV RBC AUTO: 92.3 FL
MONOCYTES # BLD AUTO: 0.29 K/UL
MONOCYTES NFR BLD AUTO: 7.1 %
NEUTROPHILS # BLD AUTO: 1.95 K/UL
NEUTROPHILS NFR BLD AUTO: 48 %
NITRITE URINE: NEGATIVE
NONHDLC SERPL-MCNC: 159 MG/DL
PH URINE: 7
PLATELET # BLD AUTO: 247 K/UL
POTASSIUM SERPL-SCNC: 4.5 MMOL/L
PROT SERPL-MCNC: 7.1 G/DL
PROTEIN URINE: NEGATIVE MG/DL
RBC # BLD: 4.52 M/UL
RBC # FLD: 14.1 %
S PYO AG SPEC QL IA: NEGATIVE
SARS-COV-2 AG RESP QL IA.RAPID: NEGATIVE
SODIUM SERPL-SCNC: 143 MMOL/L
SPECIFIC GRAVITY URINE: 1.01
TRIGL SERPL-MCNC: 110 MG/DL
TSH SERPL-ACNC: 0.78 UIU/ML
UROBILINOGEN URINE: 0.2 MG/DL
WBC # FLD AUTO: 4.07 K/UL

## 2025-05-12 ENCOUNTER — APPOINTMENT (OUTPATIENT)
Dept: DERMATOLOGY | Facility: CLINIC | Age: 70
End: 2025-05-12
Payer: MEDICARE

## 2025-05-12 PROCEDURE — 99213 OFFICE O/P EST LOW 20 MIN: CPT

## 2025-05-29 ENCOUNTER — NON-APPOINTMENT (OUTPATIENT)
Age: 70
End: 2025-05-29

## 2025-05-29 ENCOUNTER — APPOINTMENT (OUTPATIENT)
Dept: INTERNAL MEDICINE | Facility: CLINIC | Age: 70
End: 2025-05-29
Payer: MEDICARE

## 2025-05-29 VITALS
BODY MASS INDEX: 28.72 KG/M2 | OXYGEN SATURATION: 99 % | DIASTOLIC BLOOD PRESSURE: 86 MMHG | HEIGHT: 67 IN | HEART RATE: 78 BPM | TEMPERATURE: 97.9 F | WEIGHT: 183 LBS | RESPIRATION RATE: 18 BRPM | SYSTOLIC BLOOD PRESSURE: 118 MMHG

## 2025-05-29 DIAGNOSIS — R22.1 LOCALIZED SWELLING, MASS AND LUMP, HEAD: ICD-10-CM

## 2025-05-29 DIAGNOSIS — G62.9 POLYNEUROPATHY, UNSPECIFIED: ICD-10-CM

## 2025-05-29 DIAGNOSIS — Z91.89 OTHER SPECIFIED PERSONAL RISK FACTORS, NOT ELSEWHERE CLASSIFIED: ICD-10-CM

## 2025-05-29 DIAGNOSIS — I83.90 ASYMPTOMATIC VARICOSE VEINS OF UNSPECIFIED LOWER EXTREMITY: ICD-10-CM

## 2025-05-29 DIAGNOSIS — M85.80 OTHER SPECIFIED DISORDERS OF BONE DENSITY AND STRUCTURE, UNSPECIFIED SITE: ICD-10-CM

## 2025-05-29 DIAGNOSIS — E04.1 NONTOXIC SINGLE THYROID NODULE: ICD-10-CM

## 2025-05-29 DIAGNOSIS — H69.90 UNSPECIFIED EUSTACHIAN TUBE DISORDER, UNSPECIFIED EAR: ICD-10-CM

## 2025-05-29 DIAGNOSIS — M24.20 DISORDER OF LIGAMENT, UNSPECIFIED SITE: ICD-10-CM

## 2025-05-29 DIAGNOSIS — R22.0 LOCALIZED SWELLING, MASS AND LUMP, HEAD: ICD-10-CM

## 2025-05-29 DIAGNOSIS — Z00.00 ENCOUNTER FOR GENERAL ADULT MEDICAL EXAMINATION W/OUT ABNORMAL FINDINGS: ICD-10-CM

## 2025-05-29 PROCEDURE — G0439: CPT

## 2025-05-29 PROCEDURE — 93000 ELECTROCARDIOGRAM COMPLETE: CPT

## 2025-05-29 PROCEDURE — 99215 OFFICE O/P EST HI 40 MIN: CPT | Mod: 25

## 2025-05-29 PROCEDURE — 36415 COLL VENOUS BLD VENIPUNCTURE: CPT

## 2025-05-29 RX ORDER — MOMETASONE 50 UG/1
50 SPRAY, METERED NASAL
Qty: 1 | Refills: 5 | Status: ACTIVE | COMMUNITY
Start: 2025-05-29 | End: 1900-01-01

## 2025-06-01 LAB
25(OH)D3 SERPL-MCNC: 27.5 NG/ML
ALBUMIN SERPL ELPH-MCNC: 4.2 G/DL
ALP BLD-CCNC: 85 U/L
ALT SERPL-CCNC: 24 U/L
ANION GAP SERPL CALC-SCNC: 12 MMOL/L
APPEARANCE: CLEAR
AST SERPL-CCNC: 21 U/L
BASOPHILS # BLD AUTO: 0.04 K/UL
BASOPHILS NFR BLD AUTO: 0.9 %
BILIRUB SERPL-MCNC: 0.3 MG/DL
BILIRUBIN URINE: NEGATIVE
BLOOD URINE: NEGATIVE
BUN SERPL-MCNC: 12 MG/DL
CALCIUM SERPL-MCNC: 9.7 MG/DL
CHLORIDE SERPL-SCNC: 104 MMOL/L
CHOLEST SERPL-MCNC: 223 MG/DL
CO2 SERPL-SCNC: 25 MMOL/L
COLOR: YELLOW
CREAT SERPL-MCNC: 0.7 MG/DL
EGFRCR SERPLBLD CKD-EPI 2021: 93 ML/MIN/1.73M2
EOSINOPHIL # BLD AUTO: 0.03 K/UL
EOSINOPHIL NFR BLD AUTO: 0.7 %
ESTIMATED AVERAGE GLUCOSE: 126 MG/DL
GLUCOSE QUALITATIVE U: NEGATIVE MG/DL
GLUCOSE SERPL-MCNC: 96 MG/DL
HBA1C MFR BLD HPLC: 6 %
HCT VFR BLD CALC: 42.5 %
HDLC SERPL-MCNC: 65 MG/DL
HGB BLD-MCNC: 14.1 G/DL
IMM GRANULOCYTES NFR BLD AUTO: 0.2 %
KETONES URINE: NEGATIVE MG/DL
LDLC SERPL-MCNC: 135 MG/DL
LEUKOCYTE ESTERASE URINE: NEGATIVE
LYMPHOCYTES # BLD AUTO: 1.81 K/UL
LYMPHOCYTES NFR BLD AUTO: 41.8 %
MAN DIFF?: NORMAL
MCHC RBC-ENTMCNC: 31 PG
MCHC RBC-ENTMCNC: 33.2 G/DL
MCV RBC AUTO: 93.4 FL
MONOCYTES # BLD AUTO: 0.32 K/UL
MONOCYTES NFR BLD AUTO: 7.4 %
NEUTROPHILS # BLD AUTO: 2.12 K/UL
NEUTROPHILS NFR BLD AUTO: 49 %
NITRITE URINE: NEGATIVE
NONHDLC SERPL-MCNC: 159 MG/DL
PH URINE: 6
PLATELET # BLD AUTO: 225 K/UL
POTASSIUM SERPL-SCNC: 4 MMOL/L
PROT SERPL-MCNC: 7.3 G/DL
PROTEIN URINE: NEGATIVE MG/DL
RBC # BLD: 4.55 M/UL
RBC # FLD: 14.7 %
SODIUM SERPL-SCNC: 140 MMOL/L
SPECIFIC GRAVITY URINE: 1.01
T3 SERPL-MCNC: 132 NG/DL
T4 FREE SERPL-MCNC: 1.1 NG/DL
TRIGL SERPL-MCNC: 136 MG/DL
TSH SERPL-ACNC: 0.57 UIU/ML
UROBILINOGEN URINE: 0.2 MG/DL
VIT B12 SERPL-MCNC: 617 PG/ML
WBC # FLD AUTO: 4.33 K/UL

## 2025-07-01 ENCOUNTER — APPOINTMENT (OUTPATIENT)
Dept: INTERNAL MEDICINE | Facility: CLINIC | Age: 70
End: 2025-07-01
Payer: MEDICARE

## 2025-07-01 VITALS
WEIGHT: 182 LBS | TEMPERATURE: 98.1 F | HEART RATE: 74 BPM | SYSTOLIC BLOOD PRESSURE: 120 MMHG | DIASTOLIC BLOOD PRESSURE: 78 MMHG | RESPIRATION RATE: 18 BRPM | OXYGEN SATURATION: 98 % | BODY MASS INDEX: 28.56 KG/M2 | HEIGHT: 67 IN

## 2025-07-01 PROBLEM — H92.02 LEFT EAR PAIN: Status: ACTIVE | Noted: 2025-07-01

## 2025-07-01 PROCEDURE — 99214 OFFICE O/P EST MOD 30 MIN: CPT

## 2025-07-01 PROCEDURE — G2211 COMPLEX E/M VISIT ADD ON: CPT

## 2025-07-01 RX ORDER — CIPROFLOXACIN AND DEXAMETHASONE 3; 1 MG/ML; MG/ML
0.3-0.1 SUSPENSION/ DROPS AURICULAR (OTIC) TWICE DAILY
Qty: 1 | Refills: 0 | Status: ACTIVE | COMMUNITY
Start: 2025-07-01 | End: 1900-01-01

## 2025-08-07 ENCOUNTER — APPOINTMENT (OUTPATIENT)
Dept: DERMATOLOGY | Facility: CLINIC | Age: 70
End: 2025-08-07
Payer: MEDICARE

## 2025-08-07 PROCEDURE — 99213 OFFICE O/P EST LOW 20 MIN: CPT

## 2025-09-05 ENCOUNTER — APPOINTMENT (OUTPATIENT)
Dept: INTERNAL MEDICINE | Facility: CLINIC | Age: 70
End: 2025-09-05
Payer: MEDICARE

## 2025-09-05 VITALS
TEMPERATURE: 97.6 F | RESPIRATION RATE: 18 BRPM | BODY MASS INDEX: 27.94 KG/M2 | OXYGEN SATURATION: 98 % | HEART RATE: 80 BPM | WEIGHT: 178 LBS | DIASTOLIC BLOOD PRESSURE: 80 MMHG | SYSTOLIC BLOOD PRESSURE: 122 MMHG | HEIGHT: 67 IN

## 2025-09-05 DIAGNOSIS — I83.90 ASYMPTOMATIC VARICOSE VEINS OF UNSPECIFIED LOWER EXTREMITY: ICD-10-CM

## 2025-09-05 DIAGNOSIS — M85.80 OTHER SPECIFIED DISORDERS OF BONE DENSITY AND STRUCTURE, UNSPECIFIED SITE: ICD-10-CM

## 2025-09-05 DIAGNOSIS — E78.00 PURE HYPERCHOLESTEROLEMIA, UNSPECIFIED: ICD-10-CM

## 2025-09-05 DIAGNOSIS — M54.2 CERVICALGIA: ICD-10-CM

## 2025-09-05 DIAGNOSIS — R73.03 PREDIABETES.: ICD-10-CM

## 2025-09-05 DIAGNOSIS — M24.20 DISORDER OF LIGAMENT, UNSPECIFIED SITE: ICD-10-CM

## 2025-09-05 DIAGNOSIS — E04.1 NONTOXIC SINGLE THYROID NODULE: ICD-10-CM

## 2025-09-05 PROCEDURE — 99214 OFFICE O/P EST MOD 30 MIN: CPT

## 2025-09-05 PROCEDURE — 36415 COLL VENOUS BLD VENIPUNCTURE: CPT

## 2025-09-05 PROCEDURE — G2211 COMPLEX E/M VISIT ADD ON: CPT

## 2025-09-10 LAB
25(OH)D3 SERPL-MCNC: 27.5 NG/ML
ALBUMIN SERPL ELPH-MCNC: 4.3 G/DL
ALP BLD-CCNC: 74 U/L
ALT SERPL-CCNC: 23 U/L
ANION GAP SERPL CALC-SCNC: 12 MMOL/L
APPEARANCE: CLEAR
AST SERPL-CCNC: 22 U/L
BASOPHILS # BLD AUTO: 0.04 K/UL
BASOPHILS NFR BLD AUTO: 1.1 %
BILIRUB SERPL-MCNC: 0.4 MG/DL
BILIRUBIN URINE: NEGATIVE
BLOOD URINE: NEGATIVE
BUN SERPL-MCNC: 9 MG/DL
CALCIUM SERPL-MCNC: 9.6 MG/DL
CHLORIDE SERPL-SCNC: 105 MMOL/L
CHOLEST SERPL-MCNC: 214 MG/DL
CO2 SERPL-SCNC: 24 MMOL/L
COLOR: YELLOW
CREAT SERPL-MCNC: 0.64 MG/DL
EGFRCR SERPLBLD CKD-EPI 2021: 94 ML/MIN/1.73M2
EOSINOPHIL # BLD AUTO: 0.03 K/UL
EOSINOPHIL NFR BLD AUTO: 0.8 %
ESTIMATED AVERAGE GLUCOSE: 126 MG/DL
GLUCOSE QUALITATIVE U: NEGATIVE MG/DL
GLUCOSE SERPL-MCNC: 100 MG/DL
HBA1C MFR BLD HPLC: 6 %
HCT VFR BLD CALC: 40.9 %
HDLC SERPL-MCNC: 60 MG/DL
HGB BLD-MCNC: 13 G/DL
IMM GRANULOCYTES NFR BLD AUTO: 0 %
KETONES URINE: NEGATIVE MG/DL
LDLC SERPL-MCNC: 134 MG/DL
LEUKOCYTE ESTERASE URINE: NEGATIVE
LYMPHOCYTES # BLD AUTO: 1.59 K/UL
LYMPHOCYTES NFR BLD AUTO: 43 %
MAN DIFF?: NORMAL
MCHC RBC-ENTMCNC: 29.8 PG
MCHC RBC-ENTMCNC: 31.8 G/DL
MCV RBC AUTO: 93.8 FL
MONOCYTES # BLD AUTO: 0.34 K/UL
MONOCYTES NFR BLD AUTO: 9.2 %
NEUTROPHILS # BLD AUTO: 1.7 K/UL
NEUTROPHILS NFR BLD AUTO: 45.9 %
NITRITE URINE: NEGATIVE
NONHDLC SERPL-MCNC: 154 MG/DL
PH URINE: 6.5
PLATELET # BLD AUTO: 223 K/UL
POTASSIUM SERPL-SCNC: 3.9 MMOL/L
PROT SERPL-MCNC: 6.9 G/DL
PROTEIN URINE: NEGATIVE MG/DL
RBC # BLD: 4.36 M/UL
RBC # FLD: 14.5 %
SODIUM SERPL-SCNC: 140 MMOL/L
SPECIFIC GRAVITY URINE: 1.01
TRIGL SERPL-MCNC: 113 MG/DL
UROBILINOGEN URINE: 0.2 MG/DL
WBC # FLD AUTO: 3.7 K/UL